# Patient Record
Sex: MALE | Race: WHITE | NOT HISPANIC OR LATINO | URBAN - METROPOLITAN AREA
[De-identification: names, ages, dates, MRNs, and addresses within clinical notes are randomized per-mention and may not be internally consistent; named-entity substitution may affect disease eponyms.]

---

## 2023-10-16 ENCOUNTER — HOSPITAL ENCOUNTER (EMERGENCY)
Facility: HOSPITAL | Age: 33
Discharge: LEFT BEFORE DISCHARGE | End: 2023-10-16
Payer: MEDICAID

## 2023-10-16 PROCEDURE — 99211 OFF/OP EST MAY X REQ PHY/QHP: CPT

## 2023-10-16 NOTE — ED TRIAGE NOTES
Patient reportedly was drinking out of an old coke bottle when he was stung by a bee. Reportedly EMS gave 0.3 epi IM and 50 mg of benadryl through peripheral IV placed today by EMS. Patient is a&o x4 with no c.o shortness of breath per EMS at this time.

## 2023-10-16 NOTE — ED NOTES
Patient left facility with IV access, RN contacted [facility] dispatch office at [phone] who stated they would reach out to patient regarding peripheral IV.

## 2023-12-09 ENCOUNTER — APPOINTMENT (OUTPATIENT)
Dept: GENERAL RADIOLOGY | Facility: HOSPITAL | Age: 33
End: 2023-12-09
Payer: MEDICAID

## 2023-12-09 ENCOUNTER — HOSPITAL ENCOUNTER (INPATIENT)
Facility: HOSPITAL | Age: 33
LOS: 2 days | Discharge: HOME | End: 2023-12-12
Attending: EMERGENCY MEDICINE | Admitting: FAMILY MEDICINE
Payer: MEDICAID

## 2023-12-09 DIAGNOSIS — F15.10 METHAMPHETAMINE ABUSE: ICD-10-CM

## 2023-12-09 DIAGNOSIS — I50.9 ACUTE CONGESTIVE HEART FAILURE, UNSPECIFIED HEART FAILURE TYPE: ICD-10-CM

## 2023-12-09 DIAGNOSIS — J81.0 ACUTE PULMONARY EDEMA: Primary | ICD-10-CM

## 2023-12-09 DIAGNOSIS — F19.90 IV DRUG USER: ICD-10-CM

## 2023-12-09 LAB
ALBUMIN SERPL-MCNC: 3.8 G/DL (ref 3.5–5.2)
ALBUMIN/GLOB SERPL: 1.6 G/DL
ALP SERPL-CCNC: 101 U/L (ref 39–117)
ALT SERPL W P-5'-P-CCNC: 22 U/L (ref 1–41)
ANION GAP SERPL CALCULATED.3IONS-SCNC: 11.2 MMOL/L (ref 5–15)
AST SERPL-CCNC: 23 U/L (ref 1–40)
BASOPHILS # BLD AUTO: 0.05 10*3/MM3 (ref 0–0.2)
BASOPHILS NFR BLD AUTO: 0.7 % (ref 0–1.5)
BILIRUB SERPL-MCNC: 1 MG/DL (ref 0–1.2)
BUN SERPL-MCNC: 19 MG/DL (ref 6–20)
BUN/CREAT SERPL: 14.1 (ref 7–25)
CALCIUM SPEC-SCNC: 9 MG/DL (ref 8.6–10.5)
CHLORIDE SERPL-SCNC: 105 MMOL/L (ref 98–107)
CO2 SERPL-SCNC: 23.8 MMOL/L (ref 22–29)
CREAT SERPL-MCNC: 1.35 MG/DL (ref 0.76–1.27)
DEPRECATED RDW RBC AUTO: 46.3 FL (ref 37–54)
EGFRCR SERPLBLD CKD-EPI 2021: 70.2 ML/MIN/1.73
EOSINOPHIL # BLD AUTO: 0.07 10*3/MM3 (ref 0–0.4)
EOSINOPHIL NFR BLD AUTO: 0.9 % (ref 0.3–6.2)
ERYTHROCYTE [DISTWIDTH] IN BLOOD BY AUTOMATED COUNT: 12.9 % (ref 12.3–15.4)
FLUAV SUBTYP SPEC NAA+PROBE: NOT DETECTED
FLUBV RNA ISLT QL NAA+PROBE: NOT DETECTED
GLOBULIN UR ELPH-MCNC: 2.4 GM/DL
GLUCOSE SERPL-MCNC: 114 MG/DL (ref 65–99)
HCT VFR BLD AUTO: 40.1 % (ref 37.5–51)
HGB BLD-MCNC: 12.7 G/DL (ref 13–17.7)
HOLD SPECIMEN: NORMAL
HOLD SPECIMEN: NORMAL
IMM GRANULOCYTES # BLD AUTO: 0.01 10*3/MM3 (ref 0–0.05)
IMM GRANULOCYTES NFR BLD AUTO: 0.1 % (ref 0–0.5)
LYMPHOCYTES # BLD AUTO: 2.39 10*3/MM3 (ref 0.7–3.1)
LYMPHOCYTES NFR BLD AUTO: 31.5 % (ref 19.6–45.3)
MCH RBC QN AUTO: 30.9 PG (ref 26.6–33)
MCHC RBC AUTO-ENTMCNC: 31.7 G/DL (ref 31.5–35.7)
MCV RBC AUTO: 97.6 FL (ref 79–97)
MONOCYTES # BLD AUTO: 0.43 10*3/MM3 (ref 0.1–0.9)
MONOCYTES NFR BLD AUTO: 5.7 % (ref 5–12)
NEUTROPHILS NFR BLD AUTO: 4.63 10*3/MM3 (ref 1.7–7)
NEUTROPHILS NFR BLD AUTO: 61.1 % (ref 42.7–76)
NRBC BLD AUTO-RTO: 0 /100 WBC (ref 0–0.2)
NT-PROBNP SERPL-MCNC: 1967 PG/ML (ref 0–450)
PLATELET # BLD AUTO: 232 10*3/MM3 (ref 140–450)
PMV BLD AUTO: 9.1 FL (ref 6–12)
POTASSIUM SERPL-SCNC: 4.2 MMOL/L (ref 3.5–5.2)
PROT SERPL-MCNC: 6.2 G/DL (ref 6–8.5)
RBC # BLD AUTO: 4.11 10*6/MM3 (ref 4.14–5.8)
RSV RNA NPH QL NAA+NON-PROBE: NOT DETECTED
SARS-COV-2 RNA RESP QL NAA+PROBE: NOT DETECTED
SODIUM SERPL-SCNC: 140 MMOL/L (ref 136–145)
TROPONIN T SERPL HS-MCNC: 22 NG/L
WBC NRBC COR # BLD AUTO: 7.58 10*3/MM3 (ref 3.4–10.8)
WHOLE BLOOD HOLD COAG: NORMAL
WHOLE BLOOD HOLD SPECIMEN: NORMAL

## 2023-12-09 PROCEDURE — 83735 ASSAY OF MAGNESIUM: CPT | Performed by: EMERGENCY MEDICINE

## 2023-12-09 PROCEDURE — 84439 ASSAY OF FREE THYROXINE: CPT | Performed by: EMERGENCY MEDICINE

## 2023-12-09 PROCEDURE — 83880 ASSAY OF NATRIURETIC PEPTIDE: CPT

## 2023-12-09 PROCEDURE — 99285 EMERGENCY DEPT VISIT HI MDM: CPT

## 2023-12-09 PROCEDURE — 84145 PROCALCITONIN (PCT): CPT | Performed by: EMERGENCY MEDICINE

## 2023-12-09 PROCEDURE — 93005 ELECTROCARDIOGRAM TRACING: CPT

## 2023-12-09 PROCEDURE — 93005 ELECTROCARDIOGRAM TRACING: CPT | Performed by: EMERGENCY MEDICINE

## 2023-12-09 PROCEDURE — 86140 C-REACTIVE PROTEIN: CPT | Performed by: EMERGENCY MEDICINE

## 2023-12-09 PROCEDURE — 71045 X-RAY EXAM CHEST 1 VIEW: CPT

## 2023-12-09 PROCEDURE — 87637 SARSCOV2&INF A&B&RSV AMP PRB: CPT

## 2023-12-09 PROCEDURE — 93010 ELECTROCARDIOGRAM REPORT: CPT | Performed by: INTERNAL MEDICINE

## 2023-12-09 PROCEDURE — 84484 ASSAY OF TROPONIN QUANT: CPT

## 2023-12-09 PROCEDURE — 80050 GENERAL HEALTH PANEL: CPT

## 2023-12-09 PROCEDURE — 36415 COLL VENOUS BLD VENIPUNCTURE: CPT

## 2023-12-09 RX ORDER — SODIUM CHLORIDE 0.9 % (FLUSH) 0.9 %
10 SYRINGE (ML) INJECTION AS NEEDED
Status: DISCONTINUED | OUTPATIENT
Start: 2023-12-09 | End: 2023-12-12 | Stop reason: HOSPADM

## 2023-12-10 ENCOUNTER — APPOINTMENT (OUTPATIENT)
Dept: CARDIOLOGY | Facility: HOSPITAL | Age: 33
End: 2023-12-10
Payer: MEDICAID

## 2023-12-10 PROBLEM — I50.9 CHF EXACERBATION: Status: ACTIVE | Noted: 2023-12-10

## 2023-12-10 PROBLEM — R06.09 DYSPNEA ON EXERTION: Status: ACTIVE | Noted: 2023-12-10

## 2023-12-10 PROBLEM — F19.90 IV DRUG USER: Status: ACTIVE | Noted: 2023-12-10

## 2023-12-10 PROBLEM — J81.0 ACUTE PULMONARY EDEMA: Status: ACTIVE | Noted: 2023-12-10

## 2023-12-10 LAB
AMPHET+METHAMPHET UR QL: POSITIVE
BARBITURATES UR QL SCN: NEGATIVE
BENZODIAZ UR QL SCN: NEGATIVE
BH CV ECHO MEAS - AO MAX PG: 14 MMHG
BH CV ECHO MEAS - AO MEAN PG: 9 MMHG
BH CV ECHO MEAS - AO ROOT DIAM: 3.7 CM
BH CV ECHO MEAS - AO V2 MAX: 186 CM/SEC
BH CV ECHO MEAS - AO V2 VTI: 31 CM
BH CV ECHO MEAS - AVA(I,D): 1.47 CM2
BH CV ECHO MEAS - EDV(CUBED): 232.6 ML
BH CV ECHO MEAS - EDV(MOD-SP2): 122 ML
BH CV ECHO MEAS - EDV(MOD-SP4): 139 ML
BH CV ECHO MEAS - EF(MOD-BP): 19.9 %
BH CV ECHO MEAS - EF(MOD-SP2): 20.2 %
BH CV ECHO MEAS - EF(MOD-SP4): 19.4 %
BH CV ECHO MEAS - ESV(CUBED): 159.2 ML
BH CV ECHO MEAS - ESV(MOD-SP2): 97.4 ML
BH CV ECHO MEAS - ESV(MOD-SP4): 112 ML
BH CV ECHO MEAS - FS: 11.9 %
BH CV ECHO MEAS - IVS/LVPW: 0.94 CM
BH CV ECHO MEAS - IVSD: 1.01 CM
BH CV ECHO MEAS - LA DIMENSION: 4.5 CM
BH CV ECHO MEAS - LAT PEAK E' VEL: 16.6 CM/SEC
BH CV ECHO MEAS - LV DIASTOLIC VOL/BSA (35-75): 67.2 CM2
BH CV ECHO MEAS - LV MASS(C)D: 270.9 GRAMS
BH CV ECHO MEAS - LV MAX PG: 1.51 MMHG
BH CV ECHO MEAS - LV MEAN PG: 1 MMHG
BH CV ECHO MEAS - LV SYSTOLIC VOL/BSA (12-30): 54.1 CM2
BH CV ECHO MEAS - LV V1 MAX: 61.5 CM/SEC
BH CV ECHO MEAS - LV V1 VTI: 10.1 CM
BH CV ECHO MEAS - LVIDD: 6.2 CM
BH CV ECHO MEAS - LVIDS: 5.4 CM
BH CV ECHO MEAS - LVOT AREA: 4.5 CM2
BH CV ECHO MEAS - LVOT DIAM: 2.4 CM
BH CV ECHO MEAS - LVPWD: 1.07 CM
BH CV ECHO MEAS - MED PEAK E' VEL: 6.4 CM/SEC
BH CV ECHO MEAS - MV A MAX VEL: 42.2 CM/SEC
BH CV ECHO MEAS - MV DEC SLOPE: 649 CM/SEC2
BH CV ECHO MEAS - MV DEC TIME: 0.16 SEC
BH CV ECHO MEAS - MV E MAX VEL: 103 CM/SEC
BH CV ECHO MEAS - MV E/A: 2.44
BH CV ECHO MEAS - RAP SYSTOLE: 3 MMHG
BH CV ECHO MEAS - RVDD: 3.2 CM
BH CV ECHO MEAS - RVSP: 49 MMHG
BH CV ECHO MEAS - SI(MOD-SP2): 11.9 ML/M2
BH CV ECHO MEAS - SI(MOD-SP4): 13 ML/M2
BH CV ECHO MEAS - SV(LVOT): 45.7 ML
BH CV ECHO MEAS - SV(MOD-SP2): 24.6 ML
BH CV ECHO MEAS - SV(MOD-SP4): 27 ML
BH CV ECHO MEAS - TAPSE (>1.6): 2.03 CM
BH CV ECHO MEAS - TR MAX PG: 46 MMHG
BH CV ECHO MEAS - TR MAX VEL: 339 CM/SEC
BH CV ECHO MEASUREMENTS AVERAGE E/E' RATIO: 8.96
CANNABINOIDS SERPL QL: POSITIVE
COCAINE UR QL: NEGATIVE
CRP SERPL-MCNC: 0.64 MG/DL (ref 0–0.5)
D-LACTATE SERPL-SCNC: 1.1 MMOL/L (ref 0.5–2)
FENTANYL UR-MCNC: NEGATIVE NG/ML
IVRT: 82 MS
MAGNESIUM SERPL-MCNC: 2.1 MG/DL (ref 1.6–2.6)
METHADONE UR QL SCN: NEGATIVE
OPIATES UR QL: NEGATIVE
OXYCODONE UR QL SCN: NEGATIVE
PROCALCITONIN SERPL-MCNC: 0.06 NG/ML (ref 0–0.25)
QT INTERVAL: 317 MS
QTC INTERVAL: 445 MS
T4 FREE SERPL-MCNC: 1.42 NG/DL (ref 0.93–1.7)
TSH SERPL DL<=0.05 MIU/L-ACNC: 1.18 UIU/ML (ref 0.27–4.2)

## 2023-12-10 PROCEDURE — 83605 ASSAY OF LACTIC ACID: CPT | Performed by: EMERGENCY MEDICINE

## 2023-12-10 PROCEDURE — 25010000002 FUROSEMIDE PER 20 MG: Performed by: EMERGENCY MEDICINE

## 2023-12-10 PROCEDURE — 80307 DRUG TEST PRSMV CHEM ANLYZR: CPT | Performed by: EMERGENCY MEDICINE

## 2023-12-10 PROCEDURE — 99223 1ST HOSP IP/OBS HIGH 75: CPT | Performed by: FAMILY MEDICINE

## 2023-12-10 PROCEDURE — 25010000002 ENOXAPARIN PER 10 MG: Performed by: FAMILY MEDICINE

## 2023-12-10 PROCEDURE — 25010000002 FUROSEMIDE PER 20 MG: Performed by: FAMILY MEDICINE

## 2023-12-10 PROCEDURE — 87040 BLOOD CULTURE FOR BACTERIA: CPT | Performed by: EMERGENCY MEDICINE

## 2023-12-10 PROCEDURE — 93306 TTE W/DOPPLER COMPLETE: CPT

## 2023-12-10 PROCEDURE — 93306 TTE W/DOPPLER COMPLETE: CPT | Performed by: INTERNAL MEDICINE

## 2023-12-10 RX ORDER — NICOTINE 21 MG/24HR
1 PATCH, TRANSDERMAL 24 HOURS TRANSDERMAL
Status: DISCONTINUED | OUTPATIENT
Start: 2023-12-10 | End: 2023-12-12 | Stop reason: HOSPADM

## 2023-12-10 RX ORDER — BISACODYL 5 MG/1
5 TABLET, DELAYED RELEASE ORAL DAILY PRN
Status: DISCONTINUED | OUTPATIENT
Start: 2023-12-10 | End: 2023-12-12 | Stop reason: HOSPADM

## 2023-12-10 RX ORDER — BISACODYL 10 MG
10 SUPPOSITORY, RECTAL RECTAL DAILY PRN
Status: DISCONTINUED | OUTPATIENT
Start: 2023-12-10 | End: 2023-12-12 | Stop reason: HOSPADM

## 2023-12-10 RX ORDER — SODIUM CHLORIDE 0.9 % (FLUSH) 0.9 %
10 SYRINGE (ML) INJECTION AS NEEDED
Status: DISCONTINUED | OUTPATIENT
Start: 2023-12-10 | End: 2023-12-12 | Stop reason: HOSPADM

## 2023-12-10 RX ORDER — FUROSEMIDE 10 MG/ML
80 INJECTION INTRAMUSCULAR; INTRAVENOUS ONCE
Status: COMPLETED | OUTPATIENT
Start: 2023-12-10 | End: 2023-12-10

## 2023-12-10 RX ORDER — FUROSEMIDE 10 MG/ML
40 INJECTION INTRAMUSCULAR; INTRAVENOUS EVERY 12 HOURS
Status: DISCONTINUED | OUTPATIENT
Start: 2023-12-10 | End: 2023-12-12

## 2023-12-10 RX ORDER — NITROGLYCERIN 0.4 MG/1
0.4 TABLET SUBLINGUAL
Status: DISCONTINUED | OUTPATIENT
Start: 2023-12-10 | End: 2023-12-12 | Stop reason: HOSPADM

## 2023-12-10 RX ORDER — ENOXAPARIN SODIUM 100 MG/ML
40 INJECTION SUBCUTANEOUS DAILY
Status: DISCONTINUED | OUTPATIENT
Start: 2023-12-10 | End: 2023-12-12 | Stop reason: HOSPADM

## 2023-12-10 RX ORDER — AMOXICILLIN 250 MG
2 CAPSULE ORAL 2 TIMES DAILY
Status: DISCONTINUED | OUTPATIENT
Start: 2023-12-10 | End: 2023-12-12 | Stop reason: HOSPADM

## 2023-12-10 RX ORDER — SODIUM CHLORIDE 9 MG/ML
40 INJECTION, SOLUTION INTRAVENOUS AS NEEDED
Status: DISCONTINUED | OUTPATIENT
Start: 2023-12-10 | End: 2023-12-12 | Stop reason: HOSPADM

## 2023-12-10 RX ORDER — SODIUM CHLORIDE 0.9 % (FLUSH) 0.9 %
10 SYRINGE (ML) INJECTION EVERY 12 HOURS SCHEDULED
Status: DISCONTINUED | OUTPATIENT
Start: 2023-12-10 | End: 2023-12-12 | Stop reason: HOSPADM

## 2023-12-10 RX ORDER — METOPROLOL SUCCINATE 25 MG/1
25 TABLET, EXTENDED RELEASE ORAL
Status: DISCONTINUED | OUTPATIENT
Start: 2023-12-10 | End: 2023-12-11

## 2023-12-10 RX ORDER — POLYETHYLENE GLYCOL 3350 17 G/17G
17 POWDER, FOR SOLUTION ORAL DAILY PRN
Status: DISCONTINUED | OUTPATIENT
Start: 2023-12-10 | End: 2023-12-12 | Stop reason: HOSPADM

## 2023-12-10 RX ADMIN — METOPROLOL SUCCINATE 25 MG: 25 TABLET, EXTENDED RELEASE ORAL at 18:21

## 2023-12-10 RX ADMIN — NICOTINE 1 PATCH: 21 PATCH, EXTENDED RELEASE TRANSDERMAL at 14:02

## 2023-12-10 RX ADMIN — FUROSEMIDE 80 MG: 10 INJECTION, SOLUTION INTRAMUSCULAR; INTRAVENOUS at 00:33

## 2023-12-10 RX ADMIN — FUROSEMIDE 40 MG: 10 INJECTION, SOLUTION INTRAMUSCULAR; INTRAVENOUS at 06:02

## 2023-12-10 RX ADMIN — FUROSEMIDE 40 MG: 10 INJECTION, SOLUTION INTRAMUSCULAR; INTRAVENOUS at 18:21

## 2023-12-10 RX ADMIN — Medication 10 ML: at 20:24

## 2023-12-10 RX ADMIN — Medication 10 ML: at 09:21

## 2023-12-10 RX ADMIN — ENOXAPARIN SODIUM 40 MG: 100 INJECTION SUBCUTANEOUS at 03:27

## 2023-12-10 RX ADMIN — SACUBITRIL AND VALSARTAN 1 TABLET: 24; 26 TABLET, FILM COATED ORAL at 20:25

## 2023-12-10 NOTE — H&P
[facility]   HISTORY AND PHYSICAL    Patient Name: Osmel Powell  : 1990  MRN: [id number]  Primary Care Physician:  Provider, No Known  Date of admission: 2023    Subjective   Subjective     Chief Complaint: Dyspnea on exertion    HPI:    Osmel Powell is a 33 y.o. male with past medical history of IV drug use, and anxiety depression presents to the ED with complaints of worsening dyspnea on exertion and lower extremity edema.  Patient states that over the last few weeks she has been having worsening dyspnea on exertion to where he is symptomatic after just walking across the room along with lower extremity edema and orthopnea.  Patient denied any chest pain, fevers or chills.  Due to his worsening symptoms patient came to the ED for further evaluation.  Patient did admit to recent IV drug use 1 week prior with methamphetamines.  In the ED patient was tachycardic on arrival with remaining vitals being within normal limits.  Drug screen was positive for THC of methamphetamines and labs showed he did have an elevated proBNP with mildly elevated troponin and CRP level with remaining labs being relatively unremarkable including a negative procalcitonin and lactic acid level.  Chest x-ray showed findings suggestive of pulmonary edema.  When asked she denied any recent fevers, chills, headaches, focal weakness, chest pain,  abdominal pain, nausea, vomiting, diarrhea, constipation, dysuria, hematuria, hematochezia, melena, or anxiety.  Patient admitted for further evaluation and treatment.    Review of Systems   All systems were reviewed and negative except for: As per HPI    Personal History     History reviewed. No pertinent past medical history.    Past Surgical History:   Procedure Laterality Date    ADENOIDECTOMY      TONSILLECTOMY         Family History: family history is not on file. Otherwise pertinent FHx was reviewed and not pertinent to current issue.    Social History:  reports that he has  been smoking cigarettes. He has a 7.50 pack-year smoking history. He has never used smokeless tobacco. He reports current drug use. Drug: Marijuana. He reports that he does not drink alcohol.    Home Medications:         Allergies:  No Known Allergies    Objective   Objective     Vitals:   Temp:  [98.2 °F (36.8 °C)-98.3 °F (36.8 °C)] 98.2 °F (36.8 °C)  Heart Rate:  [106-125] 106  Resp:  [18-20] 18  BP: (123-134)/(81-99) 128/81  Physical Exam    Constitutional: Awake, alert   Eyes: PERRLA, sclerae anicteric, no conjunctival injection   HENT: NCAT, mucous membranes moist   Neck: Supple, no thyromegaly, no lymphadenopathy, trachea midline   Respiratory: Clear to auscultation bilaterally, nonlabored respirations    Cardiovascular: Tachycardia, systolic murmur, no rubs, or gallops, palpable pedal pulses bilaterally   Gastrointestinal: Positive bowel sounds, soft, nontender, nondistended   Musculoskeletal: Bilateral lower extremity edema, no clubbing or cyanosis to extremities   Psychiatric: Appropriate affect, cooperative   Neurologic: Oriented x 3, strength symmetric in all extremities, Cranial Nerves grossly intact to confrontation, speech clear   Skin: No rashes     Result Review    Result Review:  I have personally reviewed the results from the time of this admission to 12/10/2023 04:37 EST and agree with these findings:  [x]  Laboratory list / accordion  []  Microbiology  [x]  Radiology  [x]  EKG/Telemetry   []  Cardiology/Vascular   []  Pathology  []  Old records  []  Other:  Most notable findings include: Elevated proBNP, x-ray with pulmonary edema, subsequent positive for methamphetamine and THC, lactic acid and procalcitonin normal      Assessment & Plan   Assessment / Plan     Brief Patient Summary:  Osmel Powell is a 33 y.o. male with past medical history of IV drug use, and anxiety depression presents to the ED with complaints of worsening dyspnea on exertion and lower extremity edema.    Active Hospital  Problems:  Active Hospital Problems    Diagnosis     **Dyspnea on exertion     IV drug user     Acute pulmonary edema      Plan:     Dyspnea on exertion  -Admit to telemetry  -Patient known IV drug user  -Likely CHF/endocarditis  -Chest x-ray reviewed  -BNP elevated  -We will diurese with IV Lasix 40 mg twice daily.  Titrate as needed  -Strict input output  -1.5 L fluid restriction  -Daily weights  -Supplemental oxygen as needed  -Echocardiogram  -Blood cultures  -Will hold off on antibiotics for now  -We will consult cardiology if warranted  -Supportive care    History of IV drug use  History of anxiety depression    GI ppx  DVT ppx        DVT prophylaxis:  Medical DVT prophylaxis orders are present.    CODE STATUS:    Level Of Support Discussed With: Patient  Code Status (Patient has no pulse and is not breathing): CPR (Attempt to Resuscitate)  Medical Interventions (Patient has pulse or is breathing): Full Support    Admission Status:  I believe this patient meets inpatient status.      Electronically signed by Gerry Moran MD, 12/10/23, 4:37 AM EST.

## 2023-12-10 NOTE — PLAN OF CARE
Goal Outcome Evaluation:   Pt alert and oriented, is able to make needs known to staff, up @ [facility], family at bedside. O2@ 97% on RA. No c/o pain nor discomfort this shift, call light in reach, will cont plan of care.

## 2023-12-10 NOTE — ED TRIAGE NOTES
Pt comes to the ER tonight for shortness of breath and swelling of the feet. Pt states he has had a head cold for about a week but the shortness of breath and swelling started about about 3 days ago.

## 2023-12-10 NOTE — PROGRESS NOTES
[facility]   Hospitalist Progress Note  Date: 12/10/2023  Patient Name: Osmel Powell  : 1990  MRN: [id number]  Date of admission: 2023  Room/Bed: 402/1      Subjective   Subjective     Chief Complaint: Worsening shortness of breath and lower extremity edema    Summary:Osmel Powell is a 33 y.o. male with past medical history of anxiety, depression and IV drug use who presented with complaints of worsening shortness of breath and lower extremity edema.  Patient has been having shortness of breath for past few weeks which has been worsening and present even while walking across the room.  It was associated with lower extremity edema and orthopnea.  Patient got concerned and presented to ED for further evaluation.  Patient stated he had used IV drug methamphetamine about 2 days back.  Also smokes half a pack a day.  Denied alcohol consumption.  On presentation, patient was tachycardic.  Drug screen was positive for THC and methamphetamine.  proBNP was elevated with a value of 1967.  Troponin of 22.  Chest x-ray suggestive of pulmonary edema.  Patient was started on IV Lasix and was admitted for further evaluation and management.    Interval Followup: No acute events overnight.  Urinary output of 2700 cc since admission.  Stated his shortness of breath is improving since admission.  Denied any fever, chills, rigors, chest pain, abdominal pain, lightheadedness, nausea or vomiting.    Review of Systems    All systems reviewed and negative except for what is outlined above.      Objective   Objective     Vitals:   Temp:  [98.2 °F (36.8 °C)-98.3 °F (36.8 °C)] 98.2 °F (36.8 °C)  Heart Rate:  [106-125] 111  Resp:  [18-20] 18  BP: (123-134)/(74-99) 129/74    Physical Exam   General: Awake, alert, NAD  Cardiovascular: RRR, no murmurs; 1+ lower extremity pitting edema bilaterally.  Pulmonary: CTA bilaterally; no wheezes; no conversational dyspnea  Gastrointestinal: S/ND/NT, +BS  Neuro: Alert, awake, oriented  x 3 speech clear; no tremor  : No Sanchez catheter; no suprapubic tenderness    Result Review    Result Review:  I have personally reviewed these results:  [x]  Laboratory      Lab 12/10/23  0027 12/09/23 2116   WBC  --  7.58   HEMOGLOBIN  --  12.7*   HEMATOCRIT  --  40.1   PLATELETS  --  232   NEUTROS ABS  --  4.63   IMMATURE GRANS (ABS)  --  0.01   LYMPHS ABS  --  2.39   MONOS ABS  --  0.43   EOS ABS  --  0.07   MCV  --  97.6*   CRP  --  0.64*   PROCALCITONIN  --  0.06   LACTATE 1.1  --          Lab 12/09/23 2116   SODIUM 140   POTASSIUM 4.2   CHLORIDE 105   CO2 23.8   ANION GAP 11.2   BUN 19   CREATININE 1.35*   EGFR 70.2   GLUCOSE 114*   CALCIUM 9.0   MAGNESIUM 2.1   TSH 1.180         Lab 12/09/23 2116   TOTAL PROTEIN 6.2   ALBUMIN 3.8   GLOBULIN 2.4   ALT (SGPT) 22   AST (SGOT) 23   BILIRUBIN 1.0   ALK PHOS 101         Lab 12/09/23 2116   PROBNP 1,967.0*   HSTROP T 22*                 Brief Urine Lab Results       None          [x]  Microbiology   Microbiology Results (last 10 days)       Procedure Component Value - Date/Time    COVID-19, FLU A/B, RSV PCR 1 HR TAT - Swab, Nasopharynx [[id number]]  (Normal) Collected: 12/09/23 2050    Lab Status: Final result Specimen: Swab from Nasopharynx Updated: 12/09/23 2200     COVID19 Not Detected     Influenza A PCR Not Detected     Influenza B PCR Not Detected     RSV, PCR Not Detected    Narrative:      Fact sheet for providers: https://www.fda.gov/media/952175/download    Fact sheet for patients: https://www.fda.gov/media/593081/download    Test performed by PCR.          [x]  Radiology  XR Chest 1 View    Result Date: 12/9/2023   Bilateral infiltrates are seen.  The findings may represent pulmonary edema with vascular congestion.  Infectious multifocal pneumonia is thought to be less likely.       Please note that portions of this note were completed with a voice recognition program.  JONATHAN FABIAN JR, MD       Electronically Signed and Approved By: JONATHAN BERNARDO  CAREN BRAND MD on 12/09/2023 at 23:11             []  EKG/Telemetry   []  Cardiology/Vascular   []  Pathology  []  Old records  []  Other:    Assessment & Plan   Assessment / Plan     Assessment:  Shortness of breath on exertion  Acute pulmonary edema  Lower extremity edema  IV drug user  Active tobacco user  Depression/anxiety    Plan:  Patient is currently being managed in hospitalist service.  S/p 1 dose of IV Lasix 80 mg in the ED.  Currently on 40 mg IV Lasix every 12 hours.  Urinary output of 2700 cc since admission.  BNP elevated on presentation.  Transthoracic echo ordered to rule out cardiomyopathy, follow-up result.  On nicotine patch for active tobacco user, smokes about half a pack a day.  Currently saturating well on room air.  On cardiac telemetry.  Continue daily weights.  Continue to monitor input and output strictly.  Will consult cardiology depending upon echocardiogram result.  Continue rest of the current management.      DVT prophylaxis:  Medical DVT prophylaxis orders are present.    CODE STATUS:   Level Of Support Discussed With: Patient  Code Status (Patient has no pulse and is not breathing): CPR (Attempt to Resuscitate)  Medical Interventions (Patient has pulse or is breathing): Full Support      Electronically signed by Elaina Tavares MD, 12/10/23, 8:14 AM EST.

## 2023-12-10 NOTE — ED PROVIDER NOTES
Time: 8:54 PM EST  Date of encounter:  12/9/2023  Independent Historian/Clinical History and Information was obtained by:   Patient    History is limited by: N/A    Chief Complaint   Patient presents with    Shortness of Breath    Edema         History of Present Illness:  Patient is a 33 y.o. year old male who presents to the emergency department for evaluation of shortness of breath. States he had a head cold 1 week ago.  The patient notes that the cough is mild.  The patient notes that he has had progressive dyspnea however with exertion.  At first it was of moderate exertion and outs with minimal exertion.  The patient also notes orthopnea.  The patient also notes PND.  The patient also notes increasing edema in his legs over the last 3 days.  The patient denies any fever, rigors or myalgias.  Patient denies any chest pain or chest pressure.  The patient denies any abdominal pain.  The patient denies any rash.  The patient does admit to IV drug use.  He states his last IV drug use was 7 days ago.  He does inject IV methamphetamine.  Patient also admits to marijuana.  States that he has no medical problems and takes no medicines  Patient Care Team  Primary Care Provider: Provider, No Known    Past Medical History:     No Known Allergies  History reviewed. No pertinent past medical history.  Past Surgical History:   Procedure Laterality Date    ADENOIDECTOMY      TONSILLECTOMY       History reviewed. No pertinent family history.    Home Medications:  Prior to Admission medications    Not on File        Social History:   Social History     Tobacco Use    Smoking status: Every Day     Packs/day: 0.50     Years: 15.00     Additional pack years: 0.00     Total pack years: 7.50     Types: Cigarettes    Smokeless tobacco: Never   Vaping Use    Vaping Use: Never used   Substance Use Topics    Alcohol use: Never    Drug use: Yes     Types: Marijuana     Comment: hx IV meth         Review of Systems:  Review of Systems  "  Constitutional:  Negative for chills, diaphoresis and fever.   HENT:  Negative for congestion, postnasal drip, rhinorrhea and sore throat.    Eyes:  Negative for photophobia.   Respiratory:  Positive for cough and shortness of breath. Negative for chest tightness.    Cardiovascular:  Positive for leg swelling. Negative for chest pain and palpitations.   Gastrointestinal:  Negative for abdominal pain, diarrhea, nausea and vomiting.   Genitourinary:  Negative for difficulty urinating, dysuria, flank pain, frequency, hematuria and urgency.   Musculoskeletal:  Negative for neck pain and neck stiffness.   Skin:  Negative for pallor and rash.   Neurological:  Negative for dizziness, syncope, weakness, numbness and headaches.   Hematological:  Negative for adenopathy. Does not bruise/bleed easily.   Psychiatric/Behavioral: Negative.          Physical Exam:  /75 (BP Location: Right arm, Patient Position: Lying)   Pulse 100   Temp 97.3 °F (36.3 °C)   Resp 18   Ht 177.8 cm (70\")   Wt 88.6 kg (195 lb 5.2 oz)   SpO2 95%   BMI 28.03 kg/m²         Physical Exam  Vitals and nursing note reviewed.   Constitutional:       General: He is not in acute distress.     Appearance: Normal appearance. He is not ill-appearing, toxic-appearing or diaphoretic.   HENT:      Head: Normocephalic and atraumatic.      Mouth/Throat:      Mouth: Mucous membranes are moist.   Eyes:      Extraocular Movements: Extraocular movements intact.      Conjunctiva/sclera: Conjunctivae normal.      Pupils: Pupils are equal, round, and reactive to light.   Cardiovascular:      Rate and Rhythm: Regular rhythm. Tachycardia present.      Pulses: Normal pulses.           Carotid pulses are 2+ on the right side and 2+ on the left side.       Radial pulses are 2+ on the right side and 2+ on the left side.        Femoral pulses are 2+ on the right side and 2+ on the left side.       Popliteal pulses are 2+ on the right side and 2+ on the left side.       "  Dorsalis pedis pulses are 2+ on the right side and 2+ on the left side.        Posterior tibial pulses are 2+ on the right side and 2+ on the left side.      Heart sounds: Normal heart sounds. No murmur heard.     Comments: The patient appears to have a systolic click  Pulmonary:      Effort: Pulmonary effort is normal. No accessory muscle usage, respiratory distress or retractions.      Breath sounds: Examination of the right-upper field reveals decreased breath sounds. Examination of the left-upper field reveals decreased breath sounds. Examination of the right-middle field reveals decreased breath sounds. Examination of the left-middle field reveals decreased breath sounds. Examination of the right-lower field reveals decreased breath sounds and rales. Examination of the left-lower field reveals decreased breath sounds and rales. Decreased breath sounds present. No wheezing, rhonchi or rales.   Chest:      Chest wall: No mass or tenderness.   Abdominal:      General: Abdomen is flat. There is no distension.      Palpations: Abdomen is soft. There is no mass or pulsatile mass.      Tenderness: There is no abdominal tenderness. There is no right CVA tenderness, left CVA tenderness, guarding or rebound.      Comments: No rigidity   Musculoskeletal:         General: No swelling, tenderness or deformity.      Cervical back: Neck supple. No tenderness.      Right lower leg: No tenderness. Edema present.      Left lower leg: No tenderness. Edema present.   Skin:     General: Skin is warm and dry.      Capillary Refill: Capillary refill takes less than 2 seconds.      Coloration: Skin is not cyanotic, jaundiced or pale.      Findings: No erythema.   Neurological:      General: No focal deficit present.      Mental Status: He is alert and oriented to person, place, and time. Mental status is at baseline.      Cranial Nerves: Cranial nerves 2-12 are intact. No cranial nerve deficit.      Sensory: Sensation is intact. No  sensory deficit.      Motor: Motor function is intact. No weakness or pronator drift.      Coordination: Coordination is intact. Coordination normal.   Psychiatric:         Attention and Perception: Attention and perception normal.         Mood and Affect: Mood normal.         Behavior: Behavior normal.                Procedures:  Procedures      Medical Decision Making:      Comorbidities that affect care:    IV drug use, current smoker    External Notes reviewed:    None      The following orders were placed and all results were independently analyzed by me:  Orders Placed This Encounter   Procedures    COVID-19, FLU A/B, RSV PCR 1 HR TAT - Swab, Nasopharynx    Blood Culture - Blood,    Blood Culture - Blood,    XR Chest 1 View    [place] Draw    Comprehensive Metabolic Panel    BNP    Single High Sensitivity Troponin T    CBC Auto Differential    Lactic Acid, Plasma    Procalcitonin    C-reactive Protein    Magnesium    T4, Free    TSH    Urine Drug Screen - Urine, Clean Catch    High Sensitivity Troponin T    Phosphorus    Magnesium    CBC Auto Differential    Magnesium    Phosphorus    CBC Auto Differential    Ambulatory Referral to Heart Failure Clinic    Undress & Gown    Continuous Pulse Oximetry    Vital Signs    Discharge Follow-up with PCP    Discharge Follow-up with Specified Provider: cardiology; 2 Weeks    Diet: Cardiac Diets; Healthy Heart (2-3 Na+); Thin (IDDSI 0)    Stress Test With Myocardial Perfusion One Day    ECG 12 Lead ED Triage Standing Order; SOA    Adult Transthoracic Echo Complete w/ Color, Spectral and Contrast if necessary per protocol    Inpatient Admission    Inpatient Admission    Discharge patient    CBC & Differential    Green Top (Gel)    Lavender Top    Gold Top - SST    Light Blue Top    CBC & Differential    CBC & Differential       Medications Given in the Emergency Department:  Medications   furosemide (LASIX) injection 80 mg (80 mg Intravenous Given 12/10/23 0033)    technetium tetrofosmin (Tc-MYOVIEW) injection 1 dose (1 dose Intravenous Given 12/11/23 1039)   technetium tetrofosmin (Tc-MYOVIEW) injection 1 dose (1 dose Intravenous Given 12/11/23 1152)   regadenoson (LEXISCAN) injection 0.4 mg (0.4 mg Intravenous Given 12/11/23 1152)        ED Course:    The patient was initially evaluated in the triage area where orders were placed. The patient was later dispositioned by Maximino Bean DO.      The patient was advised to stay for completion of workup which includes but is not limited to communication of labs and radiological results, reassessment and plan. The patient was advised that leaving prior to disposition by a provider could result in critical findings that are not communicated to the patient.     ED Course as of 12/14/23 0319   Sat Dec 09, 2023   2053 EKG:    Rhythm: Sinus tachycardia  Rate: 118  Intervals: Normal NH and QT interval  Probable left atrial enlargement  T-wave: Nonspecific T wave flattening  ST Segment: No pathological ST elevation and reciprocal ST depression to suggest STEMI    EKG Comparison: No EKG available for comparison    Interpreted by me   [SD]      ED Course User Index  [SD] Maximino Bean DO       Labs:    Lab Results (last 24 hours)       ** No results found for the last 24 hours. **             Imaging:    No Radiology Exams Resulted Within Past 24 Hours      Differential Diagnosis and Discussion:      Dyspnea: Differential diagnosis includes but is not limited to metabolic acidosis, neurological disorders, psychogenic, asthma, pneumothorax, upper airway obstruction, COPD, pneumonia, noncardiogenic pulmonary edema, interstitial lung disease, anemia, congestive heart failure, and pulmonary embolism    All labs were reviewed and interpreted by me.  All X-rays impressions were independently interpreted by me.  EKG was interpreted by me.    MDM  Number of Diagnoses or Management Options  Acute congestive heart failure, unspecified heart  failure type  Acute pulmonary edema  IV drug user  Methamphetamine abuse  Diagnosis management comments: The patient's urine toxicology was positive for methamphetamine and marijuana.    Blood cultures were ordered and pending at the time of admission    The patient's TSH and free T4 were normal.  I do not feel that the patient had thyrotoxicosis or thyroid storm and thus not the cause of the patient's symptoms    Patient's procalcitonin was normal    Patient had a mildly elevated C-reactive protein at 0.64 which is nonspecific    The patient's CMP was reviewed and shows no abnormalities of critical concern.  Of note, the patient's sodium and potassium are acceptable.  The patient's liver enzymes are unremarkable.  The patient's renal function including creatinine is preserved.  The patient has a normal anion gap.    The patient's CBC was reviewed and shows no abnormalities of critical concern.  Of note, there is no anemia requiring a blood transfusion and the platelet count is acceptable    The patient's Covid swab was negative   The patient's Influenza swab was negative     X-ray demonstrates bilateral infiltrates.  The patient's elevated BNP this is thought to be more related to pulmonary edema than multifocal pneumonia    Patient's proBNP was elevated at 1969 which is significant elevated for the patient's age.    The patient was given IV Lasix in the emergency room.    The patient will be admitted for further evaluation of the acute pulmonary edema in light of the patient's age, chest x-ray and elevated BNP    Endocarditis will be ruled out upon admission.  Echocardiogram will be performed in the morning.  Antibiotics will be based upon blood cultures, echocardiogram and clinical outcome       Amount and/or Complexity of Data Reviewed  Clinical lab tests: reviewed  Tests in the radiology section of CPT®: reviewed  Tests in the medicine section of CPT®: reviewed  Discuss the patient with other providers: yes  (01:06 EST  I discussed the case with the hospitalist.  We have discussed the patient's presenting symptoms, laboratory values, imaging and condition at the time of admission.  They will evaluate the patient in the emergency room and admit the patient to the hospital)         Social Determinants of Health:    Patient is independent, reliable, and has access to care.       Disposition and Care Coordination:    Admit:   Through independent evaluation of the patient's history, physical, and imperical data, the patient meets criteria for observation/admission to the hospital.        Final diagnoses:   Acute pulmonary edema   Acute congestive heart failure, unspecified heart failure type   IV drug user   Methamphetamine abuse        ED Disposition       ED Disposition   Decision to Admit    Condition   --    Comment   Level of Care: Telemetry [5]   Diagnosis: CHF exacerbation [735887]   Admitting Physician: ARABELLA OLEARY [[id number]]   Certification: I Certify That Inpatient Hospital Services Are Medically Necessary For Greater Than 2 Midnights                 This medical record created using voice recognition software.             Maximino Bean,   12/14/23 0319

## 2023-12-10 NOTE — PLAN OF CARE
12/26: 3.9 (nicole) No acute changes throughout shift today, echo performed, MD made aware of results, Cardio consulted, vss, no complaints or needs voiced at this time.   Problem: Adjustment to Illness (Heart Failure)  Goal: Optimal Coping  Outcome: Ongoing, Progressing  Intervention: Support Psychosocial Response  Recent Flowsheet Documentation  Taken 12/10/2023 0900 by Nelly Mcgrath RN  Supportive Measures: self-care encouraged  Family/Support System Care: self-care encouraged     Problem: Cardiac Output Decreased (Heart Failure)  Goal: Optimal Cardiac Output  Outcome: Ongoing, Progressing     Problem: Dysrhythmia (Heart Failure)  Goal: Stable Heart Rate and Rhythm  Outcome: Ongoing, Progressing     Problem: Fluid Imbalance (Heart Failure)  Goal: Fluid Balance  Outcome: Ongoing, Progressing     Problem: Functional Ability Impaired (Heart Failure)  Goal: Optimal Functional Ability  Outcome: Ongoing, Progressing     Problem: Oral Intake Inadequate (Heart Failure)  Goal: Optimal Nutrition Intake  Outcome: Ongoing, Progressing     Problem: Respiratory Compromise (Heart Failure)  Goal: Effective Oxygenation and Ventilation  Outcome: Ongoing, Progressing  Intervention: Promote Airway Secretion Clearance  Recent Flowsheet Documentation  Taken 12/10/2023 0900 by Nelly Mcgrath RN  Cough And Deep Breathing: done independently per patient     Problem: Sleep Disordered Breathing (Heart Failure)  Goal: Effective Breathing Pattern During Sleep  Outcome: Ongoing, Progressing   Goal Outcome Evaluation:

## 2023-12-11 ENCOUNTER — APPOINTMENT (OUTPATIENT)
Dept: NUCLEAR MEDICINE | Facility: HOSPITAL | Age: 33
End: 2023-12-11
Payer: MEDICAID

## 2023-12-11 LAB
ANION GAP SERPL CALCULATED.3IONS-SCNC: 11.4 MMOL/L (ref 5–15)
BASOPHILS # BLD AUTO: 0.08 10*3/MM3 (ref 0–0.2)
BASOPHILS NFR BLD AUTO: 1 % (ref 0–1.5)
BH CV IMMEDIATE POST TECH DATA BLOOD PRESSURE: NORMAL MMHG
BH CV IMMEDIATE POST TECH DATA HEART RATE: 106 BPM
BH CV IMMEDIATE POST TECH DATA OXYGEN SATS: 95 %
BH CV REST NUCLEAR ISOTOPE DOSE: 9 MCI
BH CV SIX MINUTE RECOVERY TECH DATA BLOOD PRESSURE: NORMAL
BH CV SIX MINUTE RECOVERY TECH DATA HEART RATE: 95 BPM
BH CV SIX MINUTE RECOVERY TECH DATA OXYGEN SATURATION: 93 %
BH CV STRESS BP STAGE 1: NORMAL
BH CV STRESS COMMENTS STAGE 1: NORMAL
BH CV STRESS DOSE REGADENOSON STAGE 1: 0.4
BH CV STRESS DURATION MIN STAGE 1: 0
BH CV STRESS DURATION SEC STAGE 1: 10
BH CV STRESS HR STAGE 1: 94
BH CV STRESS NUCLEAR ISOTOPE DOSE: 30 MCI
BH CV STRESS O2 STAGE 1: 95
BH CV STRESS PROTOCOL 1: NORMAL
BH CV STRESS RECOVERY BP: NORMAL MMHG
BH CV STRESS RECOVERY HR: 95 BPM
BH CV STRESS RECOVERY O2: 93 %
BH CV STRESS STAGE 1: 1
BH CV THREE MINUTE POST TECH DATA BLOOD PRESSURE: NORMAL MMHG
BH CV THREE MINUTE POST TECH DATA HEART RATE: 102 BPM
BH CV THREE MINUTE POST TECH DATA OXYGEN SATURATION: 95 %
BUN SERPL-MCNC: 16 MG/DL (ref 6–20)
BUN/CREAT SERPL: 13.7 (ref 7–25)
CALCIUM SPEC-SCNC: 9.1 MG/DL (ref 8.6–10.5)
CHLORIDE SERPL-SCNC: 101 MMOL/L (ref 98–107)
CO2 SERPL-SCNC: 26.6 MMOL/L (ref 22–29)
CREAT SERPL-MCNC: 1.17 MG/DL (ref 0.76–1.27)
DEPRECATED RDW RBC AUTO: 44.1 FL (ref 37–54)
EGFRCR SERPLBLD CKD-EPI 2021: 83.4 ML/MIN/1.73
EOSINOPHIL # BLD AUTO: 0.16 10*3/MM3 (ref 0–0.4)
EOSINOPHIL NFR BLD AUTO: 2 % (ref 0.3–6.2)
ERYTHROCYTE [DISTWIDTH] IN BLOOD BY AUTOMATED COUNT: 12.8 % (ref 12.3–15.4)
GLUCOSE SERPL-MCNC: 86 MG/DL (ref 65–99)
HCT VFR BLD AUTO: 43.9 % (ref 37.5–51)
HGB BLD-MCNC: 14.6 G/DL (ref 13–17.7)
IMM GRANULOCYTES # BLD AUTO: 0.01 10*3/MM3 (ref 0–0.05)
IMM GRANULOCYTES NFR BLD AUTO: 0.1 % (ref 0–0.5)
LV EF NUC BP: 9 %
LYMPHOCYTES # BLD AUTO: 3.27 10*3/MM3 (ref 0.7–3.1)
LYMPHOCYTES NFR BLD AUTO: 40 % (ref 19.6–45.3)
MAGNESIUM SERPL-MCNC: 2.2 MG/DL (ref 1.6–2.6)
MAXIMAL PREDICTED HEART RATE: 185 BPM
MCH RBC QN AUTO: 31.5 PG (ref 26.6–33)
MCHC RBC AUTO-ENTMCNC: 33.3 G/DL (ref 31.5–35.7)
MCV RBC AUTO: 94.8 FL (ref 79–97)
MONOCYTES # BLD AUTO: 0.6 10*3/MM3 (ref 0.1–0.9)
MONOCYTES NFR BLD AUTO: 7.3 % (ref 5–12)
NEUTROPHILS NFR BLD AUTO: 4.06 10*3/MM3 (ref 1.7–7)
NEUTROPHILS NFR BLD AUTO: 49.6 % (ref 42.7–76)
NRBC BLD AUTO-RTO: 0 /100 WBC (ref 0–0.2)
PERCENT MAX PREDICTED HR: 57.3 %
PHOSPHATE SERPL-MCNC: 3.8 MG/DL (ref 2.5–4.5)
PLATELET # BLD AUTO: 265 10*3/MM3 (ref 140–450)
PMV BLD AUTO: 9.5 FL (ref 6–12)
POTASSIUM SERPL-SCNC: 3.8 MMOL/L (ref 3.5–5.2)
RBC # BLD AUTO: 4.63 10*6/MM3 (ref 4.14–5.8)
SODIUM SERPL-SCNC: 139 MMOL/L (ref 136–145)
STRESS BASELINE BP: NORMAL MMHG
STRESS BASELINE HR: 94 BPM
STRESS O2 SAT REST: 93 %
STRESS PERCENT HR: 67 %
STRESS POST O2 SAT PEAK: 95 %
STRESS POST PEAK BP: NORMAL MMHG
STRESS POST PEAK HR: 106 BPM
STRESS TARGET HR: 157 BPM
TROPONIN T SERPL HS-MCNC: 19 NG/L
WBC NRBC COR # BLD AUTO: 8.18 10*3/MM3 (ref 3.4–10.8)

## 2023-12-11 PROCEDURE — 93018 CV STRESS TEST I&R ONLY: CPT | Performed by: INTERNAL MEDICINE

## 2023-12-11 PROCEDURE — 25010000002 REGADENOSON 0.4 MG/5ML SOLUTION: Performed by: STUDENT IN AN ORGANIZED HEALTH CARE EDUCATION/TRAINING PROGRAM

## 2023-12-11 PROCEDURE — 85025 COMPLETE CBC W/AUTO DIFF WBC: CPT | Performed by: FAMILY MEDICINE

## 2023-12-11 PROCEDURE — 78452 HT MUSCLE IMAGE SPECT MULT: CPT

## 2023-12-11 PROCEDURE — 83735 ASSAY OF MAGNESIUM: CPT | Performed by: STUDENT IN AN ORGANIZED HEALTH CARE EDUCATION/TRAINING PROGRAM

## 2023-12-11 PROCEDURE — 93016 CV STRESS TEST SUPVJ ONLY: CPT | Performed by: NURSE PRACTITIONER

## 2023-12-11 PROCEDURE — 25010000002 FUROSEMIDE PER 20 MG: Performed by: FAMILY MEDICINE

## 2023-12-11 PROCEDURE — 93017 CV STRESS TEST TRACING ONLY: CPT

## 2023-12-11 PROCEDURE — 80048 BASIC METABOLIC PNL TOTAL CA: CPT | Performed by: FAMILY MEDICINE

## 2023-12-11 PROCEDURE — 99232 SBSQ HOSP IP/OBS MODERATE 35: CPT | Performed by: STUDENT IN AN ORGANIZED HEALTH CARE EDUCATION/TRAINING PROGRAM

## 2023-12-11 PROCEDURE — 84484 ASSAY OF TROPONIN QUANT: CPT | Performed by: STUDENT IN AN ORGANIZED HEALTH CARE EDUCATION/TRAINING PROGRAM

## 2023-12-11 PROCEDURE — A9502 TC99M TETROFOSMIN: HCPCS | Performed by: STUDENT IN AN ORGANIZED HEALTH CARE EDUCATION/TRAINING PROGRAM

## 2023-12-11 PROCEDURE — 84100 ASSAY OF PHOSPHORUS: CPT | Performed by: STUDENT IN AN ORGANIZED HEALTH CARE EDUCATION/TRAINING PROGRAM

## 2023-12-11 PROCEDURE — 99222 1ST HOSP IP/OBS MODERATE 55: CPT | Performed by: INTERNAL MEDICINE

## 2023-12-11 PROCEDURE — 0 TECHNETIUM TETROFOSMIN KIT: Performed by: STUDENT IN AN ORGANIZED HEALTH CARE EDUCATION/TRAINING PROGRAM

## 2023-12-11 PROCEDURE — 25010000002 ENOXAPARIN PER 10 MG: Performed by: FAMILY MEDICINE

## 2023-12-11 PROCEDURE — 78452 HT MUSCLE IMAGE SPECT MULT: CPT | Performed by: INTERNAL MEDICINE

## 2023-12-11 RX ORDER — CARVEDILOL 6.25 MG/1
6.25 TABLET ORAL 2 TIMES DAILY WITH MEALS
Status: DISCONTINUED | OUTPATIENT
Start: 2023-12-11 | End: 2023-12-12 | Stop reason: HOSPADM

## 2023-12-11 RX ORDER — REGADENOSON 0.08 MG/ML
0.4 INJECTION, SOLUTION INTRAVENOUS
Status: COMPLETED | OUTPATIENT
Start: 2023-12-11 | End: 2023-12-11

## 2023-12-11 RX ORDER — SPIRONOLACTONE 25 MG/1
25 TABLET ORAL DAILY
Status: DISCONTINUED | OUTPATIENT
Start: 2023-12-11 | End: 2023-12-12 | Stop reason: HOSPADM

## 2023-12-11 RX ADMIN — ENOXAPARIN SODIUM 40 MG: 100 INJECTION SUBCUTANEOUS at 09:14

## 2023-12-11 RX ADMIN — SACUBITRIL AND VALSARTAN 1 TABLET: 24; 26 TABLET, FILM COATED ORAL at 20:51

## 2023-12-11 RX ADMIN — TETROFOSMIN 1 DOSE: 1.38 INJECTION, POWDER, LYOPHILIZED, FOR SOLUTION INTRAVENOUS at 11:52

## 2023-12-11 RX ADMIN — CARVEDILOL 6.25 MG: 6.25 TABLET, FILM COATED ORAL at 18:14

## 2023-12-11 RX ADMIN — NICOTINE 1 PATCH: 21 PATCH, EXTENDED RELEASE TRANSDERMAL at 09:15

## 2023-12-11 RX ADMIN — REGADENOSON 0.4 MG: 0.08 INJECTION, SOLUTION INTRAVENOUS at 11:52

## 2023-12-11 RX ADMIN — TETROFOSMIN 1 DOSE: 1.38 INJECTION, POWDER, LYOPHILIZED, FOR SOLUTION INTRAVENOUS at 10:39

## 2023-12-11 RX ADMIN — SPIRONOLACTONE 25 MG: 25 TABLET ORAL at 14:06

## 2023-12-11 RX ADMIN — Medication 10 ML: at 20:51

## 2023-12-11 RX ADMIN — FUROSEMIDE 40 MG: 10 INJECTION, SOLUTION INTRAMUSCULAR; INTRAVENOUS at 09:14

## 2023-12-11 RX ADMIN — FUROSEMIDE 40 MG: 10 INJECTION, SOLUTION INTRAMUSCULAR; INTRAVENOUS at 18:14

## 2023-12-11 RX ADMIN — Medication 10 ML: at 09:14

## 2023-12-11 NOTE — PROGRESS NOTES
[facility]   Hospitalist Progress Note  Date: 2023  Patient Name: Osmel Powell  : 1990  MRN: [id number]  Date of admission: 2023  Room/Bed: 402/1      Subjective   Subjective     Chief Complaint: Worsening shortness of breath and lower extremity edema    Summary:Osmel Powell is a 33 y.o. male with past medical history of anxiety, depression and IV drug use who presented with complaints of worsening shortness of breath and lower extremity edema.  Patient has been having shortness of breath for past few weeks which has been worsening and present even while walking across the room.  It was associated with lower extremity edema and orthopnea.  Patient got concerned and presented to ED for further evaluation.  Patient stated he had used IV drug methamphetamine about 2 days back.  Also smokes half a pack a day.  Denied alcohol consumption.  On presentation, patient was tachycardic.  Drug screen was positive for THC and methamphetamine.  proBNP was elevated with a value of 1969.  Troponin of 22.  Chest x-ray suggestive of pulmonary edema.  Patient was started on IV Lasix and was admitted for further evaluation and management.    Interval Followup: No acute events overnight.  Stated his shortness of breath has improving since admission.  Denied any fever, chills, rigors, chest pain, abdominal pain, lightheadedness, nausea or vomiting.  Discussed his Echo and stress test result.    Review of Systems    All systems reviewed and negative except for what is outlined above.      Objective   Objective     Vitals:   Temp:  [97.3 °F (36.3 °C)-98.2 °F (36.8 °C)] 97.5 °F (36.4 °C)  Heart Rate:  [] 92  Resp:  [18-20] 20  BP: (107-147)/(73-99) 115/73    Physical Exam   General: Awake, alert, NAD  Cardiovascular: RRR, no murmurs; 1+ lower extremity pitting edema bilaterally.  Pulmonary: CTA bilaterally; no wheezes; no conversational dyspnea  Gastrointestinal: S/ND/NT, +BS  Neuro: Alert, awake, oriented  x 3 speech clear; no tremor  : No Sanchez catheter; no suprapubic tenderness    Result Review    Result Review:  I have personally reviewed these results:  [x]  Laboratory      Lab 12/11/23  0434 12/10/23  0027 12/09/23  2116   WBC 8.18  --  7.58   HEMOGLOBIN 14.6  --  12.7*   HEMATOCRIT 43.9  --  40.1   PLATELETS 265  --  232   NEUTROS ABS 4.06  --  4.63   IMMATURE GRANS (ABS) 0.01  --  0.01   LYMPHS ABS 3.27*  --  2.39   MONOS ABS 0.60  --  0.43   EOS ABS 0.16  --  0.07   MCV 94.8  --  97.6*   CRP  --   --  0.64*   PROCALCITONIN  --   --  0.06   LACTATE  --  1.1  --          Lab 12/11/23 0434 12/09/23 2116   SODIUM 139 140   POTASSIUM 3.8 4.2   CHLORIDE 101 105   CO2 26.6 23.8   ANION GAP 11.4 11.2   BUN 16 19   CREATININE 1.17 1.35*   EGFR 83.4 70.2   GLUCOSE 86 114*   CALCIUM 9.1 9.0   MAGNESIUM 2.2 2.1   PHOSPHORUS 3.8  --    TSH  --  1.180         Lab 12/09/23 2116   TOTAL PROTEIN 6.2   ALBUMIN 3.8   GLOBULIN 2.4   ALT (SGPT) 22   AST (SGOT) 23   BILIRUBIN 1.0   ALK PHOS 101         Lab 12/11/23 0434 12/09/23 2116   PROBNP  --  1,967.0*   HSTROP T 19 22*                 Brief Urine Lab Results       None          [x]  Microbiology   Microbiology Results (last 10 days)       Procedure Component Value - Date/Time    Blood Culture - Blood, Arm, Left [[id number]]  (Normal) Collected: 12/10/23 0027    Lab Status: Preliminary result Specimen: Blood from Arm, Left Updated: 12/11/23 0045     Blood Culture No growth at 24 hours    Blood Culture - Blood, Arm, Left [[id number]]  (Normal) Collected: 12/10/23 0027    Lab Status: Preliminary result Specimen: Blood from Arm, Left Updated: 12/11/23 0045     Blood Culture No growth at 24 hours    COVID-19, FLU A/B, RSV PCR 1 HR TAT - Swab, Nasopharynx [[id number]]  (Normal) Collected: 12/09/23 2050    Lab Status: Final result Specimen: Swab from Nasopharynx Updated: 12/09/23 2200     COVID19 Not Detected     Influenza A PCR Not Detected     Influenza B PCR Not Detected      RSV, PCR Not Detected    Narrative:      Fact sheet for providers: https://www.fda.gov/media/890766/download    Fact sheet for patients: https://www.fda.gov/media/214203/download    Test performed by PCR.          [x]  Radiology  XR Chest 1 View    Result Date: 12/9/2023   Bilateral infiltrates are seen.  The findings may represent pulmonary edema with vascular congestion.  Infectious multifocal pneumonia is thought to be less likely.       Please note that portions of this note were completed with a voice recognition program.  JONATHAN FABIAN JR, MD       Electronically Signed and Approved By: JONATHAN FABIAN JR, MD on 12/09/2023 at 23:11             []  EKG/Telemetry   []  Cardiology/Vascular   []  Pathology  []  Old records  []  Other:    Assessment & Plan   Assessment / Plan     Assessment:  Shortness of breath on exertion  Acute pulmonary edema  Lower extremity edema  IV drug user  Active tobacco user  Depression/anxiety    Plan:  Patient is currently being managed in hospitalist service.  S/p 1 dose of IV Lasix 80 mg in the ED.  Currently on 40 mg IV Lasix every 12 hours.  Transthoracic echo on 12/10 showed EF less than 20%, moderately dilated left ventricular cavity, left atrial cavity moderately dilated and moderately elevated right ventricular systolic pressure along with small pericardial effusion.  Underwent stress test with  myocardial perfusion today on 12/11 which showed EF of 9%, global hypokinesis of the LV wall consistent with nonischemic dilated cardiomyopathy; normal myocardial perfusion study with no evidence of ischemia.  Started on carvedilol 6.25 mg twice daily, Entresto 24-26 mg 1 tablet every 12 hours along with Aldactone 25 mg daily for GDMT.  Cardiology following the patient.  Appreciate further recommendation.  Given it is nonischemic cardiomyopathy, cardiology not recommending LifeVest as there are no documented ventricular arrhythmia with it.  Outpatient follow-up with  cardiology.  Discussion regarding abstinence from drugs and risk of not doing so was strictly discussed at bedside.  On nicotine patch for active tobacco user, smokes about half a pack a day.  Currently saturating well on room air.  On cardiac telemetry.  Continue daily weights.  Continue to monitor input and output strictly.  Continue rest of the current management.  Likely discharge in next 24 hours.      DVT prophylaxis:  Medical DVT prophylaxis orders are present.    CODE STATUS:   Level Of Support Discussed With: Patient  Code Status (Patient has no pulse and is not breathing): CPR (Attempt to Resuscitate)  Medical Interventions (Patient has pulse or is breathing): Full Support      Electronically signed by Elaina Tavares MD, 12/11/23, 8:14 AM EST.

## 2023-12-11 NOTE — PLAN OF CARE
Goal Outcome Evaluation:      No acute changes throughout shift today, vss, up ad shantell, stress test today, diet restarted per Cardiology, continue plan of care.

## 2023-12-11 NOTE — CONSULTS
Cardiology Consult Note  [facility] 4TH FLOOR MEDICAL TELEMETRY UNIT          Patient Identification:  Osmel Powell      [id number]  33 y.o.        male  1990       Date of Consultation: 12/11/2023    Reason for Consultation: New onset heart failure    PCP: Provider, No Known  Primary cardiologist: None    History of Present Illness:     33-year-old white male.  He has a longstanding history of methamphetamine use over the past 3 years.  He has been in a treatment program before but has since relapsed.  Over the past 5 days before admission he has increased shortness of breath, and noticeable lower extremity edema.  Some orthopnea.  On admission his chest x-ray showed cardiomegaly and evidence of pulmonary edema, elevated BNP.  Subsequent echo shows severe global cardiomyopathy ejection fraction 20%.  The patient denies chest pain.  No family history of early heart failure or heart disease.  He is a light smoker.    Past History:  History reviewed. No pertinent past medical history.  Past Surgical History:   Procedure Laterality Date    ADENOIDECTOMY      TONSILLECTOMY       No Known Allergies  Social History     Socioeconomic History    Marital status: Single   Tobacco Use    Smoking status: Every Day     Packs/day: 0.50     Years: 15.00     Additional pack years: 0.00     Total pack years: 7.50     Types: Cigarettes    Smokeless tobacco: Never   Vaping Use    Vaping Use: Never used   Substance and Sexual Activity    Alcohol use: Never    Drug use: Yes     Types: Marijuana     Comment: hx IV meth     History reviewed. No pertinent family history.  Medications:  No medications prior to admission.     Current medications:  enoxaparin, 40 mg, Subcutaneous, Daily  furosemide, 40 mg, Intravenous, Q12H  metoprolol succinate XL, 25 mg, Oral, Q24H  nicotine, 1 patch, Transdermal, Q24H  sacubitril-valsartan, 1 tablet, Oral, Q12H  senna-docusate sodium, 2 tablet, Oral, BID  sodium chloride, 10 mL,  "Intravenous, Q12H      Current IV drips:       Review of Systems   Constitutional: Positive for malaise/fatigue.   HENT: Negative.     Eyes: Negative.    Cardiovascular:  Positive for dyspnea on exertion and leg swelling. Negative for chest pain.   Respiratory:  Positive for shortness of breath.    Endocrine: Negative.    Hematologic/Lymphatic: Negative.    Skin: Negative.    Musculoskeletal: Negative.    Gastrointestinal: Negative.    Genitourinary: Negative.    Neurological: Negative.    Psychiatric/Behavioral: Negative.           Physical exam:    /89 (BP Location: Right arm, Patient Position: Lying)   Pulse 104   Temp 97.5 °F (36.4 °C) (Oral)   Resp 18   Ht 177.8 cm (70\")   Wt 89.2 kg (196 lb 10.4 oz)   SpO2 94%   BMI 28.22 kg/m²  Body mass index is 28.22 kg/m².   SpO2  Min: 94 %  Max: 98 %    General Appearance:   well developed  well nourished  HENT:   oropharynx moist  lips not cyanotic  Neck:  thyroid not enlarged  supple  Respiratory:  no respiratory distress  normal breath sounds  no rales  Cardiovascular:  no jugular venous distention  regular rhythm  apical impulse lateral  S1 normal, S2 normal  no S3, no S4   no murmur  no rub, no thrill  carotid pulses normal; no bruit  pedal pulses normal  lower extremity edema: Trace  Gastrointestinal:   bowel sounds normal  non-tender  no hepatomegaly, no splenomegaly  Musculoskeletal:  no clubbing of fingers.   normocephalic, head atraumatic  Skin:   warm, dry  Neuro/Psychiatric:  judgement and insight appropriate  normal mood and affect    Cardiographics:     ECG  (personally reviewed) reviewed by me, sinus tachycardia, left axis, nonspecific T wave changes, no previous for comparison   Telemetry:  (personally reviewed)    ECHO:   Results for orders placed during the hospital encounter of 12/09/23    Adult Transthoracic Echo Complete w/ Color, Spectral and Contrast if necessary per protocol    Interpretation Summary    Left ventricular ejection " "fraction appears to be less than 20%.    The left ventricular cavity is moderately dilated.    The left atrial cavity is moderately dilated.    Estimated right ventricular systolic pressure from tricuspid regurgitation is moderately elevated (45-55 mmHg).    There is a small (<1cm) pericardial effusion.       CATH:     CARDIOLITE:      Lab Review:       Results from last 7 days   Lab Units 12/11/23  0434 12/09/23 2116   WBC 10*3/mm3 8.18 7.58   HEMOGLOBIN g/dL 14.6 12.7*   HEMATOCRIT % 43.9 40.1            Results from last 7 days   Lab Units 12/11/23  0434 12/09/23 2116   SODIUM mmol/L 139 140   BUN mg/dL 16 19   CREATININE mg/dL 1.17 1.35*   GLUCOSE mg/dL 86 114*      Estimated Creatinine Clearance: 99.1 mL/min (by C-G formula based on SCr of 1.17 mg/dL).         Invalid input(s): \"LDLCALC\"          Lab Results   Component Value Date    TSH 1.180 12/09/2023      No results found for: \"HGBA1C\"        No results found for: \"DIGOXIN\"   No components found for: \"DDIMERQUAN\"     Imaging:   XR Chest 1 View    Result Date: 12/9/2023  PROCEDURE: XR CHEST 1 VW  COMPARISON: None.  INDICATIONS: SOA/SOB/shortness of air/shortness of breath.  FINDINGS: A single AP upright portable view of the chest is provided for review.  Bilateral infiltrates are seen.  The findings may represent infectious multifocal pneumonia.  Pulmonary edema is possible.  There is mild-to-moderate cardiomegaly.  No pneumothorax.  No pneumomediastinum.  No pleural effusion.  There is slight pulmonary hypoinflation.  External artifacts obscure detail.      Impression:  Bilateral infiltrates are seen.  The findings may represent pulmonary edema with vascular congestion.  Infectious multifocal pneumonia is thought to be less likely.       Please note that portions of this note were completed with a voice recognition program.  JONATHAN FABIAN JR, MD       Electronically Signed and Approved By: JONATHAN FABIAN JR, MD on 12/09/2023 at 23:11                 The " ASCVD Risk score (Cristian GALLO, et al., 2019) failed to calculate for the following reasons:    The 2019 ASCVD risk score is only valid for ages 40 to 79      Assessment:      Dyspnea on exertion    IV drug user    Acute pulmonary edema      Initial cardiac assessment: 33-year-old white male longstanding methamphetamine user presents with increased shortness of breath and volume overload.  Echo shows severe global cardiomyopathy.  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  He has responded well to initial diuretic therapy and starting guideline directed medical therapy.      Recommendations:  1.  Changed to carvedilol  2.  Continue Entresto  3.  Add Aldactone  4.  Stress imaging today to evaluate for possible underlying ischemia which is unlikely given the clinical scenario and young age of the patient  5.  I had a lengthy discussion with him today he needs to get plugged back into drug recovery program it is critical long-term abstinence from methamphetamine.  Otherwise his cardiomyopathy will not improve.  Time will tell if this is chronic or partially reversible but he must be free of drug use for any chance of significant recovery.  6.  If no significant ischemia and medical therapy stable possibly discharge by tomorrow with outpatient follow-up                Matthew Parmar MD  12/11/2023    10:05 EST

## 2023-12-12 ENCOUNTER — TELEPHONE (OUTPATIENT)
Dept: CARDIOLOGY | Facility: CLINIC | Age: 33
End: 2023-12-12

## 2023-12-12 ENCOUNTER — READMISSION MANAGEMENT (OUTPATIENT)
Dept: CALL CENTER | Facility: HOSPITAL | Age: 33
End: 2023-12-12
Payer: MEDICAID

## 2023-12-12 VITALS
HEIGHT: 70 IN | OXYGEN SATURATION: 95 % | WEIGHT: 195.33 LBS | DIASTOLIC BLOOD PRESSURE: 75 MMHG | RESPIRATION RATE: 18 BRPM | SYSTOLIC BLOOD PRESSURE: 130 MMHG | TEMPERATURE: 97.3 F | HEART RATE: 100 BPM | BODY MASS INDEX: 27.96 KG/M2

## 2023-12-12 LAB
ANION GAP SERPL CALCULATED.3IONS-SCNC: 8.3 MMOL/L (ref 5–15)
BASOPHILS # BLD AUTO: 0.07 10*3/MM3 (ref 0–0.2)
BASOPHILS NFR BLD AUTO: 0.7 % (ref 0–1.5)
BUN SERPL-MCNC: 16 MG/DL (ref 6–20)
BUN/CREAT SERPL: 13.9 (ref 7–25)
CALCIUM SPEC-SCNC: 9.1 MG/DL (ref 8.6–10.5)
CHLORIDE SERPL-SCNC: 100 MMOL/L (ref 98–107)
CO2 SERPL-SCNC: 27.7 MMOL/L (ref 22–29)
CREAT SERPL-MCNC: 1.15 MG/DL (ref 0.76–1.27)
DEPRECATED RDW RBC AUTO: 43.7 FL (ref 37–54)
EGFRCR SERPLBLD CKD-EPI 2021: 85.1 ML/MIN/1.73
EOSINOPHIL # BLD AUTO: 0.21 10*3/MM3 (ref 0–0.4)
EOSINOPHIL NFR BLD AUTO: 2 % (ref 0.3–6.2)
ERYTHROCYTE [DISTWIDTH] IN BLOOD BY AUTOMATED COUNT: 12.7 % (ref 12.3–15.4)
GLUCOSE SERPL-MCNC: 102 MG/DL (ref 65–99)
HCT VFR BLD AUTO: 47.3 % (ref 37.5–51)
HGB BLD-MCNC: 15.5 G/DL (ref 13–17.7)
IMM GRANULOCYTES # BLD AUTO: 0.03 10*3/MM3 (ref 0–0.05)
IMM GRANULOCYTES NFR BLD AUTO: 0.3 % (ref 0–0.5)
LYMPHOCYTES # BLD AUTO: 2.83 10*3/MM3 (ref 0.7–3.1)
LYMPHOCYTES NFR BLD AUTO: 26.8 % (ref 19.6–45.3)
MAGNESIUM SERPL-MCNC: 2.1 MG/DL (ref 1.6–2.6)
MCH RBC QN AUTO: 30.8 PG (ref 26.6–33)
MCHC RBC AUTO-ENTMCNC: 32.8 G/DL (ref 31.5–35.7)
MCV RBC AUTO: 93.8 FL (ref 79–97)
MONOCYTES # BLD AUTO: 0.64 10*3/MM3 (ref 0.1–0.9)
MONOCYTES NFR BLD AUTO: 6.1 % (ref 5–12)
NEUTROPHILS NFR BLD AUTO: 6.77 10*3/MM3 (ref 1.7–7)
NEUTROPHILS NFR BLD AUTO: 64.1 % (ref 42.7–76)
NRBC BLD AUTO-RTO: 0 /100 WBC (ref 0–0.2)
PHOSPHATE SERPL-MCNC: 4 MG/DL (ref 2.5–4.5)
PLATELET # BLD AUTO: 271 10*3/MM3 (ref 140–450)
PMV BLD AUTO: 9 FL (ref 6–12)
POTASSIUM SERPL-SCNC: 4.1 MMOL/L (ref 3.5–5.2)
RBC # BLD AUTO: 5.04 10*6/MM3 (ref 4.14–5.8)
SODIUM SERPL-SCNC: 136 MMOL/L (ref 136–145)
WBC NRBC COR # BLD AUTO: 10.55 10*3/MM3 (ref 3.4–10.8)

## 2023-12-12 PROCEDURE — 99239 HOSP IP/OBS DSCHRG MGMT >30: CPT | Performed by: INTERNAL MEDICINE

## 2023-12-12 PROCEDURE — 84100 ASSAY OF PHOSPHORUS: CPT | Performed by: STUDENT IN AN ORGANIZED HEALTH CARE EDUCATION/TRAINING PROGRAM

## 2023-12-12 PROCEDURE — 25010000002 FUROSEMIDE PER 20 MG: Performed by: FAMILY MEDICINE

## 2023-12-12 PROCEDURE — 85025 COMPLETE CBC W/AUTO DIFF WBC: CPT | Performed by: STUDENT IN AN ORGANIZED HEALTH CARE EDUCATION/TRAINING PROGRAM

## 2023-12-12 PROCEDURE — 80048 BASIC METABOLIC PNL TOTAL CA: CPT | Performed by: FAMILY MEDICINE

## 2023-12-12 PROCEDURE — 25010000002 ENOXAPARIN PER 10 MG: Performed by: FAMILY MEDICINE

## 2023-12-12 PROCEDURE — 83735 ASSAY OF MAGNESIUM: CPT | Performed by: STUDENT IN AN ORGANIZED HEALTH CARE EDUCATION/TRAINING PROGRAM

## 2023-12-12 PROCEDURE — 99232 SBSQ HOSP IP/OBS MODERATE 35: CPT | Performed by: INTERNAL MEDICINE

## 2023-12-12 RX ORDER — FUROSEMIDE 40 MG/1
40 TABLET ORAL DAILY
Qty: 30 TABLET | Refills: 3 | Status: SHIPPED | OUTPATIENT
Start: 2023-12-12

## 2023-12-12 RX ORDER — CARVEDILOL 6.25 MG/1
6.25 TABLET ORAL 2 TIMES DAILY WITH MEALS
Qty: 60 TABLET | Refills: 3 | Status: SHIPPED | OUTPATIENT
Start: 2023-12-12

## 2023-12-12 RX ORDER — FUROSEMIDE 40 MG/1
40 TABLET ORAL DAILY
Status: DISCONTINUED | OUTPATIENT
Start: 2023-12-12 | End: 2023-12-12 | Stop reason: HOSPADM

## 2023-12-12 RX ORDER — SPIRONOLACTONE 25 MG/1
25 TABLET ORAL DAILY
Qty: 30 TABLET | Refills: 3 | Status: SHIPPED | OUTPATIENT
Start: 2023-12-13

## 2023-12-12 RX ADMIN — CARVEDILOL 6.25 MG: 6.25 TABLET, FILM COATED ORAL at 08:13

## 2023-12-12 RX ADMIN — SACUBITRIL AND VALSARTAN 1 TABLET: 24; 26 TABLET, FILM COATED ORAL at 08:12

## 2023-12-12 RX ADMIN — FUROSEMIDE 40 MG: 10 INJECTION, SOLUTION INTRAMUSCULAR; INTRAVENOUS at 08:12

## 2023-12-12 RX ADMIN — DOCUSATE SODIUM 50MG AND SENNOSIDES 8.6MG 2 TABLET: 8.6; 5 TABLET, FILM COATED ORAL at 08:12

## 2023-12-12 RX ADMIN — SPIRONOLACTONE 25 MG: 25 TABLET ORAL at 08:13

## 2023-12-12 RX ADMIN — ENOXAPARIN SODIUM 40 MG: 100 INJECTION SUBCUTANEOUS at 08:11

## 2023-12-12 RX ADMIN — Medication 10 ML: at 08:12

## 2023-12-12 RX ADMIN — NICOTINE 1 PATCH: 21 PATCH, EXTENDED RELEASE TRANSDERMAL at 08:13

## 2023-12-12 NOTE — PLAN OF CARE
Phase 1 - Admission/Acute - Current (Heart Failure Pathway)  Initiate Guideline Directed Medical Therapy (ex.ACE, ARBs, ARNI, Beta Blockers, SGLT2 Inhibitors).  Outcome: Met     Problem: Adjustment to Illness (Heart Failure)  Goal: Optimal Coping  12/12/2023 1243 by Nelly Mcgrath RN  Outcome: Met  12/12/2023 1243 by Nelly Mcgrath RN  Outcome: Ongoing, Progressing  Intervention: Support Psychosocial Response  Recent Flowsheet Documentation  Taken 12/12/2023 0900 by Nelly Mcgrath RN  Supportive Measures: self-care encouraged  Family/Support System Care: self-care encouraged     Problem: Cardiac Output Decreased (Heart Failure)  Goal: Optimal Cardiac Output  12/12/2023 1243 by Nelly Mcgrath RN  Outcome: Met  12/12/2023 1243 by Nelly Mcgrath RN  Outcome: Ongoing, Progressing  Intervention: Optimize Cardiac Output  Recent Flowsheet Documentation  Taken 12/12/2023 0900 by Nelly Mcgrath RN  Environmental Support: calm environment promoted     Problem: Dysrhythmia (Heart Failure)  Goal: Stable Heart Rate and Rhythm  12/12/2023 1243 by Nelly Mcgrath RN  Outcome: Met  12/12/2023 1243 by Nelly Mcgrath RN  Outcome: Ongoing, Progressing     Problem: Fluid Imbalance (Heart Failure)  Goal: Fluid Balance  12/12/2023 1243 by Nelly Mcgrath RN  Outcome: Met  12/12/2023 1243 by Nelly Mcgrath RN  Outcome: Ongoing, Progressing     Problem: Functional Ability Impaired (Heart Failure)  Goal: Optimal Functional Ability  12/12/2023 1243 by Nelly Mcgrath RN  Outcome: Met  12/12/2023 1243 by Nelly Mcgrath RN  Outcome: Ongoing, Progressing  Intervention: Optimize Functional Ability  Recent Flowsheet Documentation  Taken 12/12/2023 0900 by Nelly Mcgrath RN  Activity Management: up ad shantell     Problem: Oral Intake Inadequate (Heart Failure)  Goal: Optimal Nutrition Intake  12/12/2023 1243 by Nelly Mcgrath RN  Outcome: Met  12/12/2023 1243 by Nelly Mcgrath RN  Outcome: Ongoing,  Progressing     Problem: Respiratory Compromise (Heart Failure)  Goal: Effective Oxygenation and Ventilation  12/12/2023 1243 by Nelly Mcgrath RN  Outcome: Met  12/12/2023 1243 by Nelly Mcgrath RN  Outcome: Ongoing, Progressing  Intervention: Promote Airway Secretion Clearance  Recent Flowsheet Documentation  Taken 12/12/2023 0900 by Nelly Mcgrath RN  Cough And Deep Breathing: done independently per patient  Intervention: Optimize Oxygenation and Ventilation  Recent Flowsheet Documentation  Taken 12/12/2023 0900 by Nelly Mcgrath RN  Airway/Ventilation Management: airway patency maintained     Problem: Sleep Disordered Breathing (Heart Failure)  Goal: Effective Breathing Pattern During Sleep  12/12/2023 1243 by Nelly Mcgrath RN  Outcome: Met  12/12/2023 1243 by Nelly Mcgrath RN  Outcome: Ongoing, Progressing     Problem: Adult Inpatient Plan of Care  Goal: Plan of Care Review  12/12/2023 1243 by Nelly Mcgrath RN  Outcome: Met  12/12/2023 1243 by Nelly Mcgrath RN  Outcome: Ongoing, Progressing  Flowsheets  Taken 12/12/2023 1243 by Nelly Mcgrath RN  Plan of Care Reviewed With: patient  Outcome Evaluation: vss, patient to be dcing today  Taken 12/12/2023 0541 by Taylor Ro LPN  Progress: improving  Goal: Patient-Specific Goal (Individualized)  12/12/2023 1243 by Nelly Mcgrath RN  Outcome: Met  12/12/2023 1243 by Nelly Mcgrath RN  Outcome: Ongoing, Progressing  Goal: Absence of Hospital-Acquired Illness or Injury  12/12/2023 1243 by Nelly Mcgrath RN  Outcome: Met  12/12/2023 1243 by Nelly Mcgrath RN  Outcome: Ongoing, Progressing  Intervention: Identify and Manage Fall Risk  Recent Flowsheet Documentation  Taken 12/12/2023 0900 by Nelly Mcgrath RN  Safety Promotion/Fall Prevention: safety round/check completed  Intervention: Prevent Skin Injury  Recent Flowsheet Documentation  Taken 12/12/2023 0900 by Nelly Mcgrath RN  Body Position: position changed  independently  Intervention: Prevent and Manage VTE (Venous Thromboembolism) Risk  Recent Flowsheet Documentation  Taken 12/12/2023 0900 by Nelly Mcrgath RN  Activity Management: up ad shantell  Goal: Optimal Comfort and Wellbeing  12/12/2023 1243 by Nelly Mcgrath RN  Outcome: Met  12/12/2023 1243 by Nelly Mcgrath RN  Outcome: Ongoing, Progressing  Intervention: Monitor Pain and Promote Comfort  Recent Flowsheet Documentation  Taken 12/12/2023 0900 by Nelly Mcgrath RN  Pain Management Interventions:   pillow support provided   position adjusted  Intervention: Provide Person-Centered Care  Recent Flowsheet Documentation  Taken 12/12/2023 0900 by Nelly Mcgrath RN  Trust Relationship/Rapport:   care explained   choices provided   emotional support provided   empathic listening provided   questions encouraged   questions answered   reassurance provided   thoughts/feelings acknowledged  Goal: Readiness for Transition of Care  12/12/2023 1243 by Nelly Mcgrath RN  Outcome: Met  12/12/2023 1243 by Nelly Mcgrath RN  Outcome: Ongoing, Progressing   Goal Outcome Evaluation:  Plan of Care Reviewed With: patient           Outcome Evaluation: vss, patient to be dcing today

## 2023-12-12 NOTE — TELEPHONE ENCOUNTER
Caller: [facility] Evelia PRUITT    Relationship to patient:     Best call back number: [phone] PATIENT'S NUMBER    Chief complaint: DYSPNEA ON EXERTION    Type of visit: HOSPITAL FOLLOW UP    Requested date: 2 WEEKS     If rescheduling, when is the original appointment:      Additional notes: DR. ZHENG NOR TAMI HERRERA HAD ANY APPOINTMENTS IN 2 WEEKS. DR. ZHENG CONSULTED IN HOSPITAL.

## 2023-12-12 NOTE — PLAN OF CARE
Goal Outcome Evaluation:  Plan of Care Reviewed With: patient        Progress: improving  Outcome Evaluation: VSS. Pt states edema improving. No complaints at this time.

## 2023-12-12 NOTE — TELEPHONE ENCOUNTER
RECEIVED MESSAGE THAT PT NEEDED A 2 WEEK HOSP FOLLOW UP. CALLED PT TO SCHEDULE APPT NO ANSWER LEFT MESSAGE REQUESTING A CB.

## 2023-12-12 NOTE — PROGRESS NOTES
CARDIOLOGY  INPATIENT PROGRESS NOTE         [facility] 4TH FLOOR MEDICAL TELEMETRY UNIT    12/12/2023      PATIENT IDENTIFICATION:   Name:  Osmel Powell      MRN:  [id number]     33 y.o.  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  Nuclear stress test yesterday did not show evidence of ischemia on the perfusion study with severely reduced left ventricular function.  OBJECTIVE:  Vitals:    12/12/23 0009 12/12/23 0450 12/12/23 0600 12/12/23 0708   BP: 107/76 117/73  128/83   BP Location: Left arm Left arm  Right arm   Patient Position: Lying Lying  Lying   Pulse:    110   Resp: 16 16  18   Temp: 97.5 °F (36.4 °C) 97.4 °F (36.3 °C)  97.5 °F (36.4 °C)   TempSrc: Oral Oral  Oral   SpO2: 94% 98%  97%   Weight:   88.6 kg (195 lb 5.2 oz)    Height:               Body mass index is 28.03 kg/m².    Intake/Output Summary (Last 24 hours) at 12/12/2023 0853  Last data filed at 12/11/2023 1700  Gross per 24 hour   Intake --   Output 1600 ml   Net -1600 ml       Telemetry: Sinus rhythm, no arrhythmias observed    Review of Systems   Respiratory:  Negative for cough, chest tightness and shortness of breath.    Cardiovascular:  Negative for chest pain.         Exam:  General appearance no acute distress    well nourished   HEENT sclerae non-icteric    lips not cyanotic   Respiratory rate and depth normal    normal breath sounds    no rales, no wheeze   Cardiovascular JVP normal    regular rhythm    S1 normal, S2 normal    no S3, no S4    no murmur    lower extremity edema:none   Abdominal bowel sounds normal    abdomen soft, non-tender    no hepatosplenomegaly   Neuro-psych oriented to person, place, time    cooperative     No Known Allergies  Scheduled meds:  carvedilol, 6.25 mg, Oral, BID With Meals  enoxaparin, 40 mg, Subcutaneous, Daily  furosemide,  "40 mg, Intravenous, Q12H  nicotine, 1 patch, Transdermal, Q24H  sacubitril-valsartan, 1 tablet, Oral, Q12H  senna-docusate sodium, 2 tablet, Oral, BID  sodium chloride, 10 mL, Intravenous, Q12H  spironolactone, 25 mg, Oral, Daily      IV meds:                         Data Review:  Results from last 7 days   Lab Units 12/12/23 0544 12/11/23 0434 12/09/23 2116   SODIUM mmol/L 136 139 140   BUN mg/dL 16 16 19   CREATININE mg/dL 1.15 1.17 1.35*   GLUCOSE mg/dL 102* 86 114*             Estimated Creatinine Clearance: 100.4 mL/min (by C-G formula based on SCr of 1.15 mg/dL).  Results from last 7 days   Lab Units 12/12/23 0544 12/11/23 0434 12/09/23 2116   WBC 10*3/mm3 10.55 8.18 7.58   HEMOGLOBIN g/dL 15.5 14.6 12.7*         Results from last 7 days   Lab Units 12/09/23 2116   ALT (SGPT) U/L 22   AST (SGOT) U/L 23     No results found for: \"DIGOXIN\"   Lab Results   Component Value Date    TSH 1.180 12/09/2023           Invalid input(s): \"LDLCALC\"            Imaging (last 24 hr):   Imaging Results (Last 24 Hours)       ** No results found for the last 24 hours. **              ASSESSMENT:     Dyspnea on exertion    IV drug user    Acute pulmonary edema      PLAN:  1.  Continue Coreg, Entresto, Aldactone  2.  Change to oral diuretic  3.  Heart failure clinic referral  4.  Okay to discharge today from my standpoint  5.  The patient has been counseled this admission on more than 1 occasion regarding the vital importance of abstinence from drug use.  He is at high risk of sudden cardiac death with methamphetamine use.  He understands this recommendation          Matthew Parmar MD  12/12/2023    08:53 EST           "

## 2023-12-12 NOTE — PLAN OF CARE
Phase 1 - Admission/Acute - Current (Heart Failure Pathway)  Initiate Guideline Directed Medical Therapy (ex.ACE, ARBs, ARNI, Beta Blockers, SGLT2 Inhibitors).  Outcome: Met     Problem: Adjustment to Illness (Heart Failure)  Goal: Optimal Coping  Outcome: Ongoing, Progressing  Intervention: Support Psychosocial Response  Recent Flowsheet Documentation  Taken 12/12/2023 0900 by Nelly Mcgrath RN  Supportive Measures: self-care encouraged  Family/Support System Care: self-care encouraged     Problem: Cardiac Output Decreased (Heart Failure)  Goal: Optimal Cardiac Output  Outcome: Ongoing, Progressing  Intervention: Optimize Cardiac Output  Recent Flowsheet Documentation  Taken 12/12/2023 0900 by Nelly Mcgrath RN  Environmental Support: calm environment promoted     Problem: Dysrhythmia (Heart Failure)  Goal: Stable Heart Rate and Rhythm  Outcome: Ongoing, Progressing     Problem: Fluid Imbalance (Heart Failure)  Goal: Fluid Balance  Outcome: Ongoing, Progressing     Problem: Functional Ability Impaired (Heart Failure)  Goal: Optimal Functional Ability  Outcome: Ongoing, Progressing  Intervention: Optimize Functional Ability  Recent Flowsheet Documentation  Taken 12/12/2023 0900 by Nelly Mcgrath RN  Activity Management: up ad shantell     Problem: Oral Intake Inadequate (Heart Failure)  Goal: Optimal Nutrition Intake  Outcome: Ongoing, Progressing     Problem: Respiratory Compromise (Heart Failure)  Goal: Effective Oxygenation and Ventilation  Outcome: Ongoing, Progressing  Intervention: Promote Airway Secretion Clearance  Recent Flowsheet Documentation  Taken 12/12/2023 0900 by Nelly Mcgrath RN  Cough And Deep Breathing: done independently per patient  Intervention: Optimize Oxygenation and Ventilation  Recent Flowsheet Documentation  Taken 12/12/2023 0900 by Nelly Mcgrath RN  Airway/Ventilation Management: airway patency maintained     Problem: Sleep Disordered Breathing (Heart Failure)  Goal:  Effective Breathing Pattern During Sleep  Outcome: Ongoing, Progressing     Problem: Adult Inpatient Plan of Care  Goal: Plan of Care Review  Outcome: Ongoing, Progressing  Flowsheets  Taken 12/12/2023 1243 by Nelly Mcgrath RN  Plan of Care Reviewed With: patient  Outcome Evaluation: vss, patient to be dcing today  Taken 12/12/2023 0541 by Taylor Ro LPN  Progress: improving  Goal: Patient-Specific Goal (Individualized)  Outcome: Ongoing, Progressing  Goal: Absence of Hospital-Acquired Illness or Injury  Outcome: Ongoing, Progressing  Intervention: Identify and Manage Fall Risk  Recent Flowsheet Documentation  Taken 12/12/2023 0900 by Nelly Mcgrath RN  Safety Promotion/Fall Prevention: safety round/check completed  Intervention: Prevent Skin Injury  Recent Flowsheet Documentation  Taken 12/12/2023 0900 by Nelly Mcgrath RN  Body Position: position changed independently  Intervention: Prevent and Manage VTE (Venous Thromboembolism) Risk  Recent Flowsheet Documentation  Taken 12/12/2023 0900 by Nelly Mcgrath RN  Activity Management: up ad shantell  Goal: Optimal Comfort and Wellbeing  Outcome: Ongoing, Progressing  Intervention: Monitor Pain and Promote Comfort  Recent Flowsheet Documentation  Taken 12/12/2023 0900 by Nelly Mcgrath RN  Pain Management Interventions:   pillow support provided   position adjusted  Intervention: Provide Person-Centered Care  Recent Flowsheet Documentation  Taken 12/12/2023 0900 by Nelly Mcgrath RN  Trust Relationship/Rapport:   care explained   choices provided   emotional support provided   empathic listening provided   questions encouraged   questions answered   reassurance provided   thoughts/feelings acknowledged  Goal: Readiness for Transition of Care  Outcome: Ongoing, Progressing   Goal Outcome Evaluation:  Plan of Care Reviewed With: patient           Outcome Evaluation: vss, patient to be dcing today

## 2023-12-12 NOTE — OUTREACH NOTE
Prep Survey      Flowsheet Row Responses   [facility] facility patient discharged from? [facility]   Is LACE score < 7 ? No   Eligibility Readm Mgmt   Discharge diagnosis Dyspnea on exertion   Does the patient have one of the following disease processes/diagnoses(primary or secondary)? CHF   Does the patient have Home health ordered? No   Is there a DME ordered? No   Medication alerts for this patient see AVS   Prep survey completed? Yes            Regla CARMONA - Registered Nurse

## 2023-12-12 NOTE — DISCHARGE SUMMARY
[facility]         HOSPITALIST  DISCHARGE SUMMARY    Patient Name: Osmel Powell  : 1990  MRN: [id number]    Date of Admission: 2023  Date of Discharge:  2023  Primary Care Physician: Provider, No Known    Consults       Date and Time Order Name Status Description    12/10/2023  5:51 PM Inpatient Cardiology Consult      12/10/2023 12:18 AM Inpatient Hospitalist Consult              Active and Resolved Hospital Problems:  Shortness of breath on exertion  Acute pulmonary edema  Lower extremity edema  IV drug user  Active tobacco user  Depression/anxiety  Acute systolic heart failure secondary to amphetamine use, nonischemic cardiomyopathy    Hospital Course     Hospital Course:  Osmel Powell is a 33 y.o. male with past medical history of anxiety, depression and IV drug use who presented with complaints of worsening shortness of breath and lower extremity edema.  Patient has been having shortness of breath for past few weeks which has been worsening and present even while walking across the room.  It was associated with lower extremity edema and orthopnea.  Patient got concerned and presented to ED for further evaluation.  Patient stated he had used IV drug methamphetamine about 2 days back.  Also smokes half a pack a day.  Denied alcohol consumption.  On presentation, patient was tachycardic.  Drug screen was positive for THC and methamphetamine.  proBNP was elevated with a value of 1967.  Troponin of 22.  Chest x-ray suggestive of pulmonary edema.  Patient was started on IV Lasix and was admitted for further evaluation and management.  Patient underwent ischemic eval which did not show any perfusion deficits, patient was started on Coreg, Entresto and Aldactone, he was placed on oral diuretics.  He was deemed safe to discharge from cardiology standpoint.  He should follow-up with cardiology in 2 weeks.  Patient is discharged home today in stable condition    Day of Discharge      Vital Signs:  Temp:  [97.3 °F (36.3 °C)-97.8 °F (36.6 °C)] 97.3 °F (36.3 °C)  Heart Rate:  [] 100  Resp:  [16-20] 18  BP: (107-130)/(73-83) 130/75    Physical Exam:   GEN: No acute distress  HEENT: Moist mucous membranes  LUNGS: Equal chest rise bilaterally  CARDIAC: Regular rate and rhythm  NEURO: Moving all 4 extremities spontaneously  SKIN: No obvious breakdown    Discharge Details        Discharge Medications        New Medications        Instructions Start Date   carvedilol 6.25 MG tablet  Commonly known as: COREG   6.25 mg, Oral, 2 Times Daily With Meals      furosemide 40 MG tablet  Commonly known as: LASIX   40 mg, Oral, Daily      sacubitril-valsartan 24-26 MG tablet  Commonly known as: ENTRESTO   1 tablet, Oral, Every 12 Hours Scheduled      spironolactone 25 MG tablet  Commonly known as: ALDACTONE   25 mg, Oral, Daily   Start Date: December 13, 2023              No Known Allergies    Discharge Disposition:  Home or Self Care    Diet:  Hospital:  Diet Order   Procedures    Diet: Cardiac Diets, Fluid Restriction (240 mL/tray) Diets; Healthy Heart (2-3 Na+); 1500 mL/day; Texture: Regular Texture (IDDSI 7); Fluid Consistency: Thin (IDDSI 0)       Discharge Activity:       CODE STATUS:  Code Status and Medical Interventions:   Ordered at: 12/10/23 0252     Level Of Support Discussed With:    Patient     Code Status (Patient has no pulse and is not breathing):    CPR (Attempt to Resuscitate)     Medical Interventions (Patient has pulse or is breathing):    Full Support       No future appointments.    Additional Instructions for the Follow-ups that You Need to Schedule       Discharge Follow-up with PCP   As directed       Currently Documented PCP:    Provider, No Known    PCP Phone Number:    None     Follow Up Details: 3 to 7 days        Discharge Follow-up with Specified Provider: cardiology; 2 Weeks   As directed      To: cardiology   Follow Up: 2 Weeks                Pertinent  and/or Most Recent  Results     IMAGING:  Stress Test With Myocardial Perfusion One Day    Result Date: 12/11/2023    Left ventricular ejection fraction is severely reduced (Calculated EF = 9%).   Abnormal LV wall motion consistent with global hypokinesis.  Findings are consistent with nonischemic dilated cardiomyopathy   Myocardial perfusion imaging indicates a normal myocardial perfusion study with no evidence of ischemia.   Impressions are consistent with a study indicating uncertain risk.   Findings consistent with a normal ECG stress test.     Adult Transthoracic Echo Complete w/ Color, Spectral and Contrast if necessary per protocol    Result Date: 12/10/2023    Left ventricular ejection fraction appears to be less than 20%.   The left ventricular cavity is moderately dilated.   The left atrial cavity is moderately dilated.   Estimated right ventricular systolic pressure from tricuspid regurgitation is moderately elevated (45-55 mmHg).   There is a small (<1cm) pericardial effusion.     XR Chest 1 View    Result Date: 12/9/2023  PROCEDURE: XR CHEST 1 VW  COMPARISON: None.  INDICATIONS: SOA/SOB/shortness of air/shortness of breath.  FINDINGS: A single AP upright portable view of the chest is provided for review.  Bilateral infiltrates are seen.  The findings may represent infectious multifocal pneumonia.  Pulmonary edema is possible.  There is mild-to-moderate cardiomegaly.  No pneumothorax.  No pneumomediastinum.  No pleural effusion.  There is slight pulmonary hypoinflation.  External artifacts obscure detail.       Bilateral infiltrates are seen.  The findings may represent pulmonary edema with vascular congestion.  Infectious multifocal pneumonia is thought to be less likely.       Please note that portions of this note were completed with a voice recognition program.  JONATHAN FABIAN JR, MD       Electronically Signed and Approved By: JONATHAN FABIAN JR, MD on 12/09/2023 at 23:11                LAB RESULTS:      Lab  12/12/23  0544 12/11/23  0434 12/10/23  0027 12/09/23 2116   WBC 10.55 8.18  --  7.58   HEMOGLOBIN 15.5 14.6  --  12.7*   HEMATOCRIT 47.3 43.9  --  40.1   PLATELETS 271 265  --  232   NEUTROS ABS 6.77 4.06  --  4.63   IMMATURE GRANS (ABS) 0.03 0.01  --  0.01   LYMPHS ABS 2.83 3.27*  --  2.39   MONOS ABS 0.64 0.60  --  0.43   EOS ABS 0.21 0.16  --  0.07   MCV 93.8 94.8  --  97.6*   CRP  --   --   --  0.64*   PROCALCITONIN  --   --   --  0.06   LACTATE  --   --  1.1  --          Lab 12/12/23  0544 12/11/23 0434 12/09/23 2116   SODIUM 136 139 140   POTASSIUM 4.1 3.8 4.2   CHLORIDE 100 101 105   CO2 27.7 26.6 23.8   ANION GAP 8.3 11.4 11.2   BUN 16 16 19   CREATININE 1.15 1.17 1.35*   EGFR 85.1 83.4 70.2   GLUCOSE 102* 86 114*   CALCIUM 9.1 9.1 9.0   MAGNESIUM 2.1 2.2 2.1   PHOSPHORUS 4.0 3.8  --    TSH  --   --  1.180         Lab 12/09/23 2116   TOTAL PROTEIN 6.2   ALBUMIN 3.8   GLOBULIN 2.4   ALT (SGPT) 22   AST (SGOT) 23   BILIRUBIN 1.0   ALK PHOS 101         Lab 12/11/23 0434 12/09/23 2116   PROBNP  --  1,967.0*   HSTROP T 19 22*                 Brief Urine Lab Results       None          Microbiology Results (last 10 days)       Procedure Component Value - Date/Time    Blood Culture - Blood, Arm, Left [[id number]]  (Normal) Collected: 12/10/23 0027    Lab Status: Preliminary result Specimen: Blood from Arm, Left Updated: 12/12/23 0045     Blood Culture No growth at 2 days    Blood Culture - Blood, Arm, Left [[id number]]  (Normal) Collected: 12/10/23 0027    Lab Status: Preliminary result Specimen: Blood from Arm, Left Updated: 12/12/23 0045     Blood Culture No growth at 2 days    COVID-19, FLU A/B, RSV PCR 1 HR TAT - Swab, Nasopharynx [[id number]]  (Normal) Collected: 12/09/23 2050    Lab Status: Final result Specimen: Swab from Nasopharynx Updated: 12/09/23 2200     COVID19 Not Detected     Influenza A PCR Not Detected     Influenza B PCR Not Detected     RSV, PCR Not Detected    Narrative:      Fact sheet for  providers: https://www.fda.gov/media/203635/download    Fact sheet for patients: https://www.fda.gov/media/837236/download    Test performed by PCR.              Results for orders placed during the hospital encounter of 12/09/23    Adult Transthoracic Echo Complete w/ Color, Spectral and Contrast if necessary per protocol    Interpretation Summary    Left ventricular ejection fraction appears to be less than 20%.    The left ventricular cavity is moderately dilated.    The left atrial cavity is moderately dilated.    Estimated right ventricular systolic pressure from tricuspid regurgitation is moderately elevated (45-55 mmHg).    There is a small (<1cm) pericardial effusion.      Time spent on Discharge including face to face service: Greater than 30 minutes      Electronically signed by Salinas Russo MD, 12/12/23, 12:39 PM EST.

## 2023-12-15 LAB
BACTERIA SPEC AEROBE CULT: NORMAL
BACTERIA SPEC AEROBE CULT: NORMAL

## 2023-12-20 ENCOUNTER — READMISSION MANAGEMENT (OUTPATIENT)
Dept: CALL CENTER | Facility: HOSPITAL | Age: 33
End: 2023-12-20
Payer: MEDICAID

## 2023-12-20 NOTE — OUTREACH NOTE
CHF Week 2 Survey      Flowsheet Row Responses   [facility]   Does the patient have one of the following disease processes/diagnoses(primary or secondary)? CHF   Week 2 attempt successful? No   Unsuccessful attempts Attempt 1            Tesha Altamirano Registered Nurse

## 2023-12-21 ENCOUNTER — READMISSION MANAGEMENT (OUTPATIENT)
Dept: CALL CENTER | Facility: HOSPITAL | Age: 33
End: 2023-12-21
Payer: MEDICAID

## 2023-12-21 NOTE — OUTREACH NOTE
CHF Week 2 Survey      Flowsheet Row Responses   [facility] facility patient discharged from? [facility]   Does the patient have one of the following disease processes/diagnoses(primary or secondary)? CHF   Week 2 attempt successful? No   Unsuccessful attempts Attempt 2  [attempted home and friend who provided an updated cell number, attempted unsuccessfully (udpated chart)]            NERISSA RIZVI - Registered Nurse

## 2024-01-04 ENCOUNTER — READMISSION MANAGEMENT (OUTPATIENT)
Dept: CALL CENTER | Facility: HOSPITAL | Age: 34
End: 2024-01-04
Payer: MEDICAID

## 2024-01-04 NOTE — OUTREACH NOTE
CHF Week 3 Survey      Flowsheet Row Responses   [facility] [place] patient discharged from? [facility]   Does the patient have one of the following disease processes/diagnoses(primary or secondary)? CHF   Week 3 attempt successful? Yes   Call start time 1114   Call end time 1118   Meds reviewed with patient/caregiver? Yes   Is the patient having any side effects they believe may be caused by any medication additions or changes? No   Does the patient have all medications ordered at discharge? Yes   Is the patient taking all medications as directed (includes completed medication regime)? Yes   Comments regarding appointments Discussed importance of f/u with HF clinic on 1/9/24   Does the patient have a primary care provider?  No   PCP Nursing Intervention Provided number to obtain PCP   Does the patient have an appointment with their PCP within 7 days of discharge? No   What is preventing the patient from scheduling follow up appointments within 7 days of discharge? Unsure of when or with whom   Nursing Interventions Advised patient to make appointment   Has the patient kept scheduled appointments due by today? N/A   Has home health visited the patient within 72 hours of discharge? N/A   Psychosocial issues? No   Did the patient receive a copy of their discharge instructions? Yes   Nursing interventions Reviewed instructions with patient   What is the patient's perception of their health status since discharge? Improving  [Pt reports he is doing well---he denies any edema and reports he is urinating well with diuretics.  Pt reports he did not know to weigh QD--this RN discussed wt gain parameters of 2-3#daily or 5# weekly and need to notify MD.]   Nursing interventions Nurse provided patient education  [Reviewed s/s HF exac with pt--he is taking meds as ordered and plans on compliance with f/u with HF clinic next week.]   Is the patient able to teach back signs and symptoms of worsening condition? (i.e. weight gain,  shortness of air, etc.) Yes   Is the patient/caregiver able to teach back the hierarchy of who to call/visit for symptoms/problems? PCP, Specialist, Home health nurse, Urgent Care, ED, 911 Yes   Additional teach back comments Pt reports he had one day recently with some increase in SOA--discussed need for monitoring daily weights, has not weighed since discharge. Does have a scale at home.   CHF Week 3 call completed? Yes   Call end time 1118            NERISSA RIZVI - Registered Nurse

## 2024-01-09 ENCOUNTER — TELEPHONE (OUTPATIENT)
Dept: CARDIOLOGY | Facility: CLINIC | Age: 34
End: 2024-01-09

## 2024-01-09 NOTE — TELEPHONE ENCOUNTER
Left a voicemail for patient to return call to office to reschedule no show appointment. Sending patient letter as well.

## 2024-01-15 ENCOUNTER — TELEPHONE (OUTPATIENT)
Dept: CARDIOLOGY | Facility: CLINIC | Age: 34
End: 2024-01-15
Payer: MEDICAID

## 2024-01-17 ENCOUNTER — OFFICE VISIT (OUTPATIENT)
Dept: CARDIOLOGY | Facility: CLINIC | Age: 34
End: 2024-01-17
Payer: MEDICAID

## 2024-01-17 VITALS
BODY MASS INDEX: 28.2 KG/M2 | SYSTOLIC BLOOD PRESSURE: 116 MMHG | WEIGHT: 197 LBS | HEART RATE: 88 BPM | HEIGHT: 70 IN | DIASTOLIC BLOOD PRESSURE: 82 MMHG

## 2024-01-17 DIAGNOSIS — F17.210 CIGARETTE NICOTINE DEPENDENCE WITHOUT COMPLICATION: ICD-10-CM

## 2024-01-17 DIAGNOSIS — F19.90 IV DRUG USER: ICD-10-CM

## 2024-01-17 DIAGNOSIS — I50.42 CHRONIC HEART FAILURE WITH REDUCED EJECTION FRACTION AND DIASTOLIC DYSFUNCTION: Primary | ICD-10-CM

## 2024-01-17 LAB
ALBUMIN SERPL-MCNC: 4.4 G/DL (ref 3.5–5.2)
ALBUMIN/GLOB SERPL: 1.2 G/DL
ALP SERPL-CCNC: 140 U/L (ref 39–117)
ALT SERPL W P-5'-P-CCNC: 33 U/L (ref 1–41)
ANION GAP SERPL CALCULATED.3IONS-SCNC: 12 MMOL/L (ref 5–15)
AST SERPL-CCNC: 24 U/L (ref 1–40)
BASOPHILS # BLD AUTO: 0.07 10*3/MM3 (ref 0–0.2)
BASOPHILS NFR BLD AUTO: 0.9 % (ref 0–1.5)
BILIRUB SERPL-MCNC: 0.6 MG/DL (ref 0–1.2)
BUN SERPL-MCNC: 14 MG/DL (ref 6–20)
BUN/CREAT SERPL: 12.6 (ref 7–25)
CALCIUM SPEC-SCNC: 9.4 MG/DL (ref 8.6–10.5)
CHLORIDE SERPL-SCNC: 102 MMOL/L (ref 98–107)
CO2 SERPL-SCNC: 25 MMOL/L (ref 22–29)
CREAT SERPL-MCNC: 1.11 MG/DL (ref 0.76–1.27)
DEPRECATED RDW RBC AUTO: 41.6 FL (ref 37–54)
EGFRCR SERPLBLD CKD-EPI 2021: 88.8 ML/MIN/1.73
EOSINOPHIL # BLD AUTO: 0.16 10*3/MM3 (ref 0–0.4)
EOSINOPHIL NFR BLD AUTO: 2.2 % (ref 0.3–6.2)
ERYTHROCYTE [DISTWIDTH] IN BLOOD BY AUTOMATED COUNT: 12.6 % (ref 12.3–15.4)
GLOBULIN UR ELPH-MCNC: 3.6 GM/DL
GLUCOSE SERPL-MCNC: 94 MG/DL (ref 65–99)
HCT VFR BLD AUTO: 46.6 % (ref 37.5–51)
HGB BLD-MCNC: 15.7 G/DL (ref 13–17.7)
IMM GRANULOCYTES # BLD AUTO: 0.01 10*3/MM3 (ref 0–0.05)
IMM GRANULOCYTES NFR BLD AUTO: 0.1 % (ref 0–0.5)
IRON 24H UR-MRATE: 108 MCG/DL (ref 59–158)
IRON SATN MFR SERPL: 28 % (ref 20–50)
LYMPHOCYTES # BLD AUTO: 3.57 10*3/MM3 (ref 0.7–3.1)
LYMPHOCYTES NFR BLD AUTO: 48.4 % (ref 19.6–45.3)
MAGNESIUM SERPL-MCNC: 2.1 MG/DL (ref 1.6–2.6)
MCH RBC QN AUTO: 30.1 PG (ref 26.6–33)
MCHC RBC AUTO-ENTMCNC: 33.7 G/DL (ref 31.5–35.7)
MCV RBC AUTO: 89.4 FL (ref 79–97)
MONOCYTES # BLD AUTO: 0.62 10*3/MM3 (ref 0.1–0.9)
MONOCYTES NFR BLD AUTO: 8.4 % (ref 5–12)
NEUTROPHILS NFR BLD AUTO: 2.95 10*3/MM3 (ref 1.7–7)
NEUTROPHILS NFR BLD AUTO: 40 % (ref 42.7–76)
NRBC BLD AUTO-RTO: 0 /100 WBC (ref 0–0.2)
NT-PROBNP SERPL-MCNC: 172.7 PG/ML (ref 0–450)
PLATELET # BLD AUTO: 241 10*3/MM3 (ref 140–450)
PMV BLD AUTO: 9.3 FL (ref 6–12)
POTASSIUM SERPL-SCNC: 3.9 MMOL/L (ref 3.5–5.2)
PROT SERPL-MCNC: 8 G/DL (ref 6–8.5)
RBC # BLD AUTO: 5.21 10*6/MM3 (ref 4.14–5.8)
SODIUM SERPL-SCNC: 139 MMOL/L (ref 136–145)
T4 FREE SERPL-MCNC: 1.28 NG/DL (ref 0.93–1.7)
TIBC SERPL-MCNC: 383 MCG/DL (ref 298–536)
TRANSFERRIN SERPL-MCNC: 257 MG/DL (ref 200–360)
TSH SERPL DL<=0.05 MIU/L-ACNC: 2.63 UIU/ML (ref 0.27–4.2)
WBC NRBC COR # BLD AUTO: 7.38 10*3/MM3 (ref 3.4–10.8)

## 2024-01-17 PROCEDURE — 83880 ASSAY OF NATRIURETIC PEPTIDE: CPT | Performed by: INTERNAL MEDICINE

## 2024-01-17 PROCEDURE — 83735 ASSAY OF MAGNESIUM: CPT | Performed by: INTERNAL MEDICINE

## 2024-01-17 PROCEDURE — 84466 ASSAY OF TRANSFERRIN: CPT | Performed by: INTERNAL MEDICINE

## 2024-01-17 PROCEDURE — 82306 VITAMIN D 25 HYDROXY: CPT | Performed by: INTERNAL MEDICINE

## 2024-01-17 PROCEDURE — 80050 GENERAL HEALTH PANEL: CPT | Performed by: INTERNAL MEDICINE

## 2024-01-17 PROCEDURE — 84439 ASSAY OF FREE THYROXINE: CPT | Performed by: INTERNAL MEDICINE

## 2024-01-17 PROCEDURE — 83540 ASSAY OF IRON: CPT | Performed by: INTERNAL MEDICINE

## 2024-01-17 RX ORDER — CARVEDILOL 12.5 MG/1
12.5 TABLET ORAL 2 TIMES DAILY WITH MEALS
Qty: 180 TABLET | Refills: 3 | Status: SHIPPED | OUTPATIENT
Start: 2024-01-17

## 2024-01-17 RX ORDER — SACUBITRIL AND VALSARTAN 49; 51 MG/1; MG/1
1 TABLET, FILM COATED ORAL 2 TIMES DAILY
Qty: 180 TABLET | Refills: 3 | Status: SHIPPED | OUTPATIENT
Start: 2024-01-17

## 2024-01-17 RX ORDER — FUROSEMIDE 40 MG/1
20 TABLET ORAL EVERY OTHER DAY
Qty: 30 TABLET | Refills: 3 | Status: SHIPPED | OUTPATIENT
Start: 2024-01-17

## 2024-01-17 NOTE — ASSESSMENT & PLAN NOTE
Mr. Powell is a 33 years old young male who was admitted to the hospital with acute heart failure and has now developed chronic heart failure with very reduced ejection fraction.  I explained to him the long-term prognosis of the condition and emphasized to him that he needs to follow all instruction along with compliance with his medication and smoking cessation and avoidance of recreational drug abuse.  He understands and is willing to comply.  The following changes were made to his medications:    1.  Increase carvedilol to 1 tablet in the morning and 2 tablets at night for 1 week followed by 2 tablets twice a day.  When you finished this prescription will get the stronger medication which will be 12.5 mg 1 tablet twice a day.  2.  Increase Entresto 24-26 mg to 1 tablet in the morning and 2 tablets at night for 1 week followed by 2 tablets twice a day till you finish the current supply.  The new prescription will be 49-51 mg 1 tablet twice a day.  3.  Reduce the Lasix/furosemide to 20 mg every other day (half a tablet of 40 mg every other day).  4.  Maintain heart rate blood pressure and weight log and bring it to us.  5.  Do blood work 1 or 2 days before your next appointment.  Following an overnight fast.

## 2024-01-17 NOTE — ASSESSMENT & PLAN NOTE
I have strongly urged him to avoid recreational drug use.  He states that he has been compliant and does not plan on going back to his old habits.

## 2024-01-17 NOTE — PATIENT INSTRUCTIONS
1.  Increase carvedilol to 1 tablet in the morning and 2 tablets at night for 1 week followed by 2 tablets twice a day.  When you finished this prescription will get the stronger medication which will be 12.5 mg 1 tablet twice a day.  2.  Increase Entresto 24-26 mg to 1 tablet in the morning and 2 tablets at night for 1 week followed by 2 tablets twice a day till you finish the current supply.  The new prescription will be 49-51 mg 1 tablet twice a day.  3.  Reduce the Lasix/furosemide to 20 mg every other day (half a tablet of 40 mg every other day).  4.  Maintain heart rate blood pressure and weight log and bring it to us.  5.  Do blood work 1 or 2 days before your next appointment.  Following an overnight fast.

## 2024-01-17 NOTE — ASSESSMENT & PLAN NOTE
Patient has been smoking cigarettes for a long.  He is smoking half to 1 pack/day.  Smoking cessation counseling was performed for 5-7 minutes.  Clearly indicated to him the need to quit smoking.  Offered him nicotine patches but he declined and said he is going to do it on his own in the next week or so.  Will monitor.

## 2024-01-17 NOTE — PROGRESS NOTES
New Patient Office Visit    Chief Complaint  acute CHF    Subjective            Osmel Powell presents to [facility] CARDIOLOGY  History of Present Illness  Mr. Osmel Powell is a young 33-year-old male with a recent admission for acute congestive heart failure who is doing a lot better since discharge.  He had a history of IV and oral drug abuse and was admitted to the hospital with acute heart failure with reduced ejection fraction.  He was feeling extremely short of breath diaphoretic with paroxysmal nocturnal dyspnea and orthopnea and symptoms at rest.  This occurred over a span of 1 month.  His ejection fraction was extremely low on admission.  Since discharge after getting diuretics and started on heart failure therapy is doing a lot better.  He feels weak and tired and fatigued but denies any significant shortness of breath on moderate exertion.  He has not overexerted himself lately.  He has given him IV and oral recreational drug abuse but still smokes.      History reviewed. No pertinent past medical history.    No Known Allergies     Past Surgical History:   Procedure Laterality Date    ADENOIDECTOMY      TONSILLECTOMY          Social History     Tobacco Use    Smoking status: Every Day     Packs/day: 0.50     Years: 15.00     Additional pack years: 0.00     Total pack years: 7.50     Types: Cigarettes    Smokeless tobacco: Never   Vaping Use    Vaping Use: Never used   Substance Use Topics    Alcohol use: Never    Drug use: Yes     Types: Marijuana     Comment: hx IV meth       History reviewed. No pertinent family history.     Prior to Admission medications    Medication Sig Start Date End Date Taking? Authorizing Provider   carvedilol (COREG) 6.25 MG tablet Take 1 tablet by mouth 2 (Two) Times a Day With Meals. 12/12/23  Yes JOANNE Parmar MD   furosemide (LASIX) 40 MG tablet Take 1 tablet by mouth Daily. 12/12/23  Yes JOANNE Parmar MD   sacubitril-valsartan (ENTRESTO) 24-26 MG  "tablet Take 1 tablet by mouth Every 12 (Twelve) Hours. 12/12/23  Yes JOANNE Parmar MD   spironolactone (ALDACTONE) 25 MG tablet Take 1 tablet by mouth Daily. 12/13/23  Yes JOANNE Parmar MD        Review of Systems   Constitutional:  Positive for fatigue. Negative for unexpected weight gain and unexpected weight loss.   HENT:  Negative for hearing loss, sinus pressure and swollen glands.    Eyes:  Negative for blurred vision and double vision.   Respiratory:  Negative for cough, shortness of breath and wheezing.    Cardiovascular:  Negative for chest pain, palpitations and leg swelling.   Gastrointestinal:  Negative for nausea and vomiting.   Endocrine: Positive for polydipsia and polyuria. Negative for cold intolerance and heat intolerance.   Musculoskeletal:  Negative for arthralgias and back pain.   Neurological:  Negative for dizziness, light-headedness and headache.   Hematological:  Does not bruise/bleed easily.        Symptom Course: Improved    Weight Trend: Stable     Objective     /82   Pulse 88   Ht 177.8 cm (70\")   Wt 89.4 kg (197 lb)   BMI 28.27 kg/m²       Physical Exam  Constitutional:       General: He is awake.      Appearance: Normal appearance.   Neck:      Thyroid: No thyromegaly.      Vascular: No carotid bruit or JVD.   Cardiovascular:      Rate and Rhythm: Normal rate and regular rhythm.      Chest Wall: PMI is not displaced.      Pulses: Normal pulses.      Heart sounds: Normal heart sounds, S1 normal and S2 normal. No murmur heard.     No friction rub. No gallop. No S3 or S4 sounds.   Pulmonary:      Effort: Pulmonary effort is normal.      Breath sounds: Normal breath sounds and air entry. No wheezing, rhonchi or rales.   Abdominal:      General: Bowel sounds are normal.      Palpations: Abdomen is soft. There is no mass.      Tenderness: There is no abdominal tenderness.   Musculoskeletal:      Cervical back: Neck supple.      Right lower leg: No edema.      Left lower leg: No " "edema.   Neurological:      Mental Status: He is alert and oriented to person, place, and time.   Psychiatric:         Mood and Affect: Mood normal.         Behavior: Behavior is cooperative.           Result Review :                      Lab Results   Component Value Date    PROBNP 1,967.0 (H) 12/09/2023     CMP          12/9/2023    21:16 12/11/2023    04:34 12/12/2023    05:44   CMP   Glucose 114  86  102    BUN 19  16  16    Creatinine 1.35  1.17  1.15    EGFR 70.2  83.4  85.1    Sodium 140  139  136    Potassium 4.2  3.8  4.1    Chloride 105  101  100    Calcium 9.0  9.1  9.1    Total Protein 6.2      Albumin 3.8      Globulin 2.4      Total Bilirubin 1.0      Alkaline Phosphatase 101      AST (SGOT) 23      ALT (SGPT) 22      Albumin/Globulin Ratio 1.6      BUN/Creatinine Ratio 14.1  13.7  13.9    Anion Gap 11.2  11.4  8.3      CBC w/diff          12/9/2023    21:16 12/11/2023    04:34 12/12/2023    05:44   CBC w/Diff   WBC 7.58  8.18  10.55    RBC 4.11  4.63  5.04    Hemoglobin 12.7  14.6  15.5    Hematocrit 40.1  43.9  47.3    MCV 97.6  94.8  93.8    MCH 30.9  31.5  30.8    MCHC 31.7  33.3  32.8    RDW 12.9  12.8  12.7    Platelets 232  265  271    Neutrophil Rel % 61.1  49.6  64.1    Immature Granulocyte Rel % 0.1  0.1  0.3    Lymphocyte Rel % 31.5  40.0  26.8    Monocyte Rel % 5.7  7.3  6.1    Eosinophil Rel % 0.9  2.0  2.0    Basophil Rel % 0.7  1.0  0.7          Lab Results   Component Value Date    TSH 1.180 12/09/2023      Lab Results   Component Value Date    FREET4 1.42 12/09/2023      No results found for: \"DDIMERQUANT\"  Magnesium   Date Value Ref Range Status   12/12/2023 2.1 1.6 - 2.6 mg/dL Final      No results found for: \"DIGOXIN\"          Results for orders placed during the hospital encounter of 12/09/23    Adult Transthoracic Echo Complete w/ Color, Spectral and Contrast if necessary per protocol    Interpretation Summary    Left ventricular ejection fraction appears to be less than 20%.    " The left ventricular cavity is moderately dilated.    The left atrial cavity is moderately dilated.    Estimated right ventricular systolic pressure from tricuspid regurgitation is moderately elevated (45-55 mmHg).    There is a small (<1cm) pericardial effusion.        Assessment and Plan        Diagnoses and all orders for this visit:    1. Chronic heart failure with reduced ejection fraction and diastolic dysfunction (Primary)  Assessment & Plan:  Mr. Powell is a 33 years old young male who was admitted to the hospital with acute heart failure and has now developed chronic heart failure with very reduced ejection fraction.  I explained to him the long-term prognosis of the condition and emphasized to him that he needs to follow all instruction along with compliance with his medication and smoking cessation and avoidance of recreational drug abuse.  He understands and is willing to comply.  The following changes were made to his medications:    1.  Increase carvedilol to 1 tablet in the morning and 2 tablets at night for 1 week followed by 2 tablets twice a day.  When you finished this prescription will get the stronger medication which will be 12.5 mg 1 tablet twice a day.  2.  Increase Entresto 24-26 mg to 1 tablet in the morning and 2 tablets at night for 1 week followed by 2 tablets twice a day till you finish the current supply.  The new prescription will be 49-51 mg 1 tablet twice a day.  3.  Reduce the Lasix/furosemide to 20 mg every other day (half a tablet of 40 mg every other day).  4.  Maintain heart rate blood pressure and weight log and bring it to us.  5.  Do blood work 1 or 2 days before your next appointment.  Following an overnight fast.    Orders:  -     CBC & Differential  -     Comprehensive Metabolic Panel  -     proBNP  -     T4, Free  -     TSH  -     Iron Profile; Future  -     25-HydroxyVitamin D LCMS D2+D3; Future  -     Comprehensive Metabolic Panel; Future  -     Magnesium; Future  -      Magnesium  -     proBNP; Future  -     Lipid Panel; Future  -     Iron Profile  -     25-HydroxyVitamin D LCMS D2+D3    2. IV drug user  Assessment & Plan:  I have strongly urged him to avoid recreational drug use.  He states that he has been compliant and does not plan on going back to his old habits.      3. Cigarette nicotine dependence without complication  Assessment & Plan:  Patient has been smoking cigarettes for a long.  He is smoking half to 1 pack/day.  Smoking cessation counseling was performed for 5-7 minutes.  Clearly indicated to him the need to quit smoking.  Offered him nicotine patches but he declined and said he is going to do it on his own in the next week or so.  Will monitor.      Other orders  -     carvedilol (COREG) 12.5 MG tablet; Take 1 tablet by mouth 2 (Two) Times a Day With Meals.  Dispense: 180 tablet; Refill: 3  -     furosemide (LASIX) 40 MG tablet; Take 0.5 tablets by mouth Every Other Day.  Dispense: 30 tablet; Refill: 3  -     sacubitril-valsartan (Entresto) 49-51 MG tablet; Take 1 tablet by mouth 2 (Two) Times a Day.  Dispense: 180 tablet; Refill: 3      Education  Osmel Powell 1990 male  who presents for follow-up of congestive heart failure. Counseling was provided to Osmel Powell for the following topics.   Daily log and monitoring of blood pressure, Osmel Powell was instructed to keep a blood pressure log to monitor for trends of normal and possible abnormal blood pressure readings. Osmel Powell was instructed to bring the blood pressure log to next appointment. Osmel Powell was instructed to keep a daily weight log and to monitor for weight gain. Instructed to notify the physician office if patient experiences a weight gain of 2-3 pounds overnight, or if experiences a weight gain of 5 pounds in a week. Instruction provided to the patient to elevate bilateral lower extremities to help alleviate edema. Instruction provided to patient to decrease salt and fluid  intake. Instruction provided on new medications and possible side effects, and when to call the physician office.       Follow Up     Return for Follow-up in 3 weeks.    Patient was given instructions and counseling regarding his condition or for health maintenance advice. Please see specific information pulled into the AVS if appropriate.     Total time spent, 40 minutes.This time includes time spent by me for the following activities:  Reviewing past records including hospitalizations, performing a cardiac focused examination and/or evaluation, educating and counselling the patient , independently interpreting results and diagnostic tests and communicating that information to patient , documenting information in the medical record, etc. E/M time spent excludes any time spent on other reported services.    Electronically signed by Shauna Groves MD, 01/17/24, 3:35 PM EST.

## 2024-01-19 ENCOUNTER — PATIENT MESSAGE (OUTPATIENT)
Dept: CARDIOLOGY | Facility: CLINIC | Age: 34
End: 2024-01-19
Payer: MEDICAID

## 2024-01-24 LAB
25(OH)D2 SERPL-MCNC: <1 NG/ML
25(OH)D3 SERPL-MCNC: 21 NG/ML
25(OH)D3+25(OH)D2 SERPL-MCNC: 21 NG/ML

## 2024-01-29 ENCOUNTER — PATIENT MESSAGE (OUTPATIENT)
Dept: CARDIOLOGY | Facility: CLINIC | Age: 34
End: 2024-01-29

## 2024-02-05 ENCOUNTER — TELEPHONE (OUTPATIENT)
Dept: CARDIOLOGY | Facility: CLINIC | Age: 34
End: 2024-02-05
Payer: MEDICAID

## 2024-02-05 NOTE — TELEPHONE ENCOUNTER
Left voice mail to remind pt of appointment on Wednesday 2/7 @ 10:00, to get fasting lab work done and to bring medications and bp log to appointment.

## 2024-05-13 ENCOUNTER — TELEPHONE (OUTPATIENT)
Dept: CARDIOLOGY | Facility: CLINIC | Age: 34
End: 2024-05-13
Payer: MEDICAID

## 2024-05-13 NOTE — TELEPHONE ENCOUNTER
Left a voicemail for patient to return call to office to reschedule missed appointments and to get over due labs complete.

## 2024-09-21 ENCOUNTER — HOSPITAL ENCOUNTER (EMERGENCY)
Facility: HOSPITAL | Age: 34
Discharge: HOME | End: 2024-09-21
Attending: EMERGENCY MEDICINE
Payer: MEDICAID

## 2024-09-21 ENCOUNTER — APPOINTMENT (OUTPATIENT)
Dept: GENERAL RADIOLOGY | Facility: HOSPITAL | Age: 34
End: 2024-09-21
Payer: MEDICAID

## 2024-09-21 VITALS
HEART RATE: 112 BPM | TEMPERATURE: 98.2 F | HEIGHT: 70 IN | OXYGEN SATURATION: 97 % | RESPIRATION RATE: 20 BRPM | DIASTOLIC BLOOD PRESSURE: 113 MMHG | SYSTOLIC BLOOD PRESSURE: 140 MMHG | WEIGHT: 228.18 LBS | BODY MASS INDEX: 32.67 KG/M2

## 2024-09-21 DIAGNOSIS — I50.9 ACUTE ON CHRONIC CONGESTIVE HEART FAILURE, UNSPECIFIED HEART FAILURE TYPE: Primary | ICD-10-CM

## 2024-09-21 LAB
ALBUMIN SERPL-MCNC: 3.7 G/DL (ref 3.5–5.2)
ALBUMIN/GLOB SERPL: 1.4 G/DL
ALP SERPL-CCNC: 111 U/L (ref 39–117)
ALT SERPL W P-5'-P-CCNC: 21 U/L (ref 1–41)
ANION GAP SERPL CALCULATED.3IONS-SCNC: 9 MMOL/L (ref 5–15)
AST SERPL-CCNC: 19 U/L (ref 1–40)
BASOPHILS # BLD AUTO: 0.03 10*3/MM3 (ref 0–0.2)
BASOPHILS NFR BLD AUTO: 0.5 % (ref 0–1.5)
BILIRUB SERPL-MCNC: 1.2 MG/DL (ref 0–1.2)
BUN SERPL-MCNC: 8 MG/DL (ref 6–20)
BUN/CREAT SERPL: 8.2 (ref 7–25)
CALCIUM SPEC-SCNC: 8.6 MG/DL (ref 8.6–10.5)
CHLORIDE SERPL-SCNC: 108 MMOL/L (ref 98–107)
CO2 SERPL-SCNC: 25 MMOL/L (ref 22–29)
CREAT SERPL-MCNC: 0.97 MG/DL (ref 0.76–1.27)
DEPRECATED RDW RBC AUTO: 46.6 FL (ref 37–54)
EGFRCR SERPLBLD CKD-EPI 2021: 103.8 ML/MIN/1.73
EOSINOPHIL # BLD AUTO: 0.08 10*3/MM3 (ref 0–0.4)
EOSINOPHIL NFR BLD AUTO: 1.3 % (ref 0.3–6.2)
ERYTHROCYTE [DISTWIDTH] IN BLOOD BY AUTOMATED COUNT: 13.6 % (ref 12.3–15.4)
FLUAV SUBTYP SPEC NAA+PROBE: NOT DETECTED
FLUBV RNA ISLT QL NAA+PROBE: NOT DETECTED
GLOBULIN UR ELPH-MCNC: 2.7 GM/DL
GLUCOSE SERPL-MCNC: 150 MG/DL (ref 65–99)
HCT VFR BLD AUTO: 35.9 % (ref 37.5–51)
HGB BLD-MCNC: 12.3 G/DL (ref 13–17.7)
HOLD SPECIMEN: NORMAL
HOLD SPECIMEN: NORMAL
IMM GRANULOCYTES # BLD AUTO: 0.02 10*3/MM3 (ref 0–0.05)
IMM GRANULOCYTES NFR BLD AUTO: 0.3 % (ref 0–0.5)
LYMPHOCYTES # BLD AUTO: 1.33 10*3/MM3 (ref 0.7–3.1)
LYMPHOCYTES NFR BLD AUTO: 21.4 % (ref 19.6–45.3)
MCH RBC QN AUTO: 32.6 PG (ref 26.6–33)
MCHC RBC AUTO-ENTMCNC: 34.3 G/DL (ref 31.5–35.7)
MCV RBC AUTO: 95.2 FL (ref 79–97)
MONOCYTES # BLD AUTO: 0.35 10*3/MM3 (ref 0.1–0.9)
MONOCYTES NFR BLD AUTO: 5.6 % (ref 5–12)
NEUTROPHILS NFR BLD AUTO: 4.41 10*3/MM3 (ref 1.7–7)
NEUTROPHILS NFR BLD AUTO: 70.9 % (ref 42.7–76)
NRBC BLD AUTO-RTO: 0 /100 WBC (ref 0–0.2)
NT-PROBNP SERPL-MCNC: 1962 PG/ML (ref 0–450)
PLATELET # BLD AUTO: 205 10*3/MM3 (ref 140–450)
PMV BLD AUTO: 9.2 FL (ref 6–12)
POTASSIUM SERPL-SCNC: 3.7 MMOL/L (ref 3.5–5.2)
PROT SERPL-MCNC: 6.4 G/DL (ref 6–8.5)
QT INTERVAL: 331 MS
QTC INTERVAL: 464 MS
RBC # BLD AUTO: 3.77 10*6/MM3 (ref 4.14–5.8)
RSV RNA NPH QL NAA+NON-PROBE: NOT DETECTED
SARS-COV-2 RNA RESP QL NAA+PROBE: NOT DETECTED
SODIUM SERPL-SCNC: 142 MMOL/L (ref 136–145)
TROPONIN T SERPL HS-MCNC: 17 NG/L
WBC NRBC COR # BLD AUTO: 6.22 10*3/MM3 (ref 3.4–10.8)
WHOLE BLOOD HOLD COAG: NORMAL
WHOLE BLOOD HOLD SPECIMEN: NORMAL

## 2024-09-21 PROCEDURE — 99284 EMERGENCY DEPT VISIT MOD MDM: CPT

## 2024-09-21 PROCEDURE — 80053 COMPREHEN METABOLIC PANEL: CPT | Performed by: EMERGENCY MEDICINE

## 2024-09-21 PROCEDURE — 87637 SARSCOV2&INF A&B&RSV AMP PRB: CPT | Performed by: EMERGENCY MEDICINE

## 2024-09-21 PROCEDURE — 93005 ELECTROCARDIOGRAM TRACING: CPT

## 2024-09-21 PROCEDURE — 71045 X-RAY EXAM CHEST 1 VIEW: CPT

## 2024-09-21 PROCEDURE — 83880 ASSAY OF NATRIURETIC PEPTIDE: CPT | Performed by: EMERGENCY MEDICINE

## 2024-09-21 PROCEDURE — 93005 ELECTROCARDIOGRAM TRACING: CPT | Performed by: EMERGENCY MEDICINE

## 2024-09-21 PROCEDURE — 84484 ASSAY OF TROPONIN QUANT: CPT | Performed by: EMERGENCY MEDICINE

## 2024-09-21 PROCEDURE — 85025 COMPLETE CBC W/AUTO DIFF WBC: CPT | Performed by: EMERGENCY MEDICINE

## 2024-09-21 PROCEDURE — 25010000002 FUROSEMIDE PER 20 MG: Performed by: EMERGENCY MEDICINE

## 2024-09-21 PROCEDURE — 96374 THER/PROPH/DIAG INJ IV PUSH: CPT

## 2024-09-21 RX ORDER — FUROSEMIDE 10 MG/ML
40 INJECTION INTRAMUSCULAR; INTRAVENOUS ONCE
Status: COMPLETED | OUTPATIENT
Start: 2024-09-21 | End: 2024-09-21

## 2024-09-21 RX ORDER — SPIRONOLACTONE 25 MG/1
25 TABLET ORAL DAILY
Qty: 30 TABLET | Refills: 0 | Status: SHIPPED | OUTPATIENT
Start: 2024-09-21

## 2024-09-21 RX ORDER — SODIUM CHLORIDE 0.9 % (FLUSH) 0.9 %
10 SYRINGE (ML) INJECTION AS NEEDED
Status: DISCONTINUED | OUTPATIENT
Start: 2024-09-21 | End: 2024-09-21 | Stop reason: HOSPADM

## 2024-09-21 RX ORDER — FUROSEMIDE 40 MG
40 TABLET ORAL DAILY
Qty: 20 TABLET | Refills: 0 | Status: SHIPPED | OUTPATIENT
Start: 2024-09-21

## 2024-09-21 RX ORDER — CARVEDILOL 12.5 MG/1
12.5 TABLET ORAL DAILY
Qty: 30 TABLET | Refills: 0 | Status: SHIPPED | OUTPATIENT
Start: 2024-09-21

## 2024-09-21 RX ADMIN — FUROSEMIDE 40 MG: 10 INJECTION, SOLUTION INTRAMUSCULAR; INTRAVENOUS at 10:07

## 2024-09-21 NOTE — DISCHARGE INSTRUCTIONS
Expect a phone call on Monday with a follow-up appointment to see Dr. Spears, cardiology.  Restart all of your medications today

## 2024-09-21 NOTE — ED PROVIDER NOTES
Time: 8:02 AM EDT  Date of encounter:  9/21/2024  Independent Historian/Clinical History and Information was obtained by:   Patient    History is limited by: N/A    Chief Complaint: Short of breath      History of Present Illness:  Patient is a 34 y.o. year old male who presents to the emergency department for evaluation of shortness of breath.  Patient has history of congestive heart failure.  He was seeing Dr. Groves, cardiology and was last seen in January of this year.  A 2D echocardiogram of the heart in December of last year showed an EF less than 20%.  Patient was placed on Coreg, spironolactone, Lasix and Entresto however he has not had his medications for over 2 months.  Patient has not had any follow-up with cardiology since his January appointment.      Patient Care Team  Primary Care Provider: Provider, No Known    Past Medical History:     No Known Allergies  History reviewed. No pertinent past medical history.  Past Surgical History:   Procedure Laterality Date    ADENOIDECTOMY      TONSILLECTOMY       History reviewed. No pertinent family history.    Home Medications:  Prior to Admission medications    Medication Sig Start Date End Date Taking? Authorizing Provider   carvedilol (COREG) 12.5 MG tablet Take 1 tablet by mouth 2 (Two) Times a Day With Meals. 1/17/24   Shauna Groves MD   furosemide (LASIX) 40 MG tablet Take 0.5 tablets by mouth Every Other Day. 1/17/24   Shauna Groves MD   sacubitril-valsartan (Entresto) 49-51 MG tablet Take 1 tablet by mouth 2 (Two) Times a Day. 1/17/24   Shauna Groves MD   spironolactone (ALDACTONE) 25 MG tablet Take 1 tablet by mouth Daily. 12/13/23   JOANNE Parmar MD        Social History:   Social History     Tobacco Use    Smoking status: Every Day     Current packs/day: 0.50     Average packs/day: 0.5 packs/day for 15.0 years (7.5 ttl pk-yrs)     Types: Cigarettes    Smokeless tobacco: Never   Vaping Use    Vaping status: Never Used   Substance Use Topics     "Alcohol use: Never    Drug use: Yes     Types: Marijuana     Comment: hx IV meth         Review of Systems:  Review of Systems     Physical Exam:  BP (!) 140/113   Pulse 112   Temp 98.3 °F (36.8 °C)   Resp 20   Ht 177.8 cm (70\")   Wt 104 kg (228 lb 2.8 oz)   SpO2 97%   BMI 32.74 kg/m²     Physical Exam   ***          Procedures:  Procedures      Medical Decision Making:      Comorbidities that affect care:    {Comorbidities that affect care:89816}    External Notes reviewed:    {External Note review (Optional):52449}      The following orders were placed and all results were independently analyzed by me:  Orders Placed This Encounter   Procedures    COVID-19, FLU A/B, RSV PCR 1 HR TAT - Swab, Nasopharynx    XR Chest 1 View    [place] Draw    Comprehensive Metabolic Panel    BNP    Single High Sensitivity Troponin T    CBC Auto Differential    Ambulatory Referral to Cardiology    NPO Diet NPO Type: Strict NPO    Undress & Gown    Continuous Pulse Oximetry    Vital Signs    Inpatient Cardiology Consult    Oxygen Therapy- Nasal Cannula; Titrate 1-6 LPM Per SpO2; 90 - 95%    ECG 12 Lead ED Triage Standing Order; SOA    Insert Peripheral IV    CBC & Differential    Green Top (Gel)    Lavender Top    Gold Top - SST    Light Blue Top       Medications Given in the Emergency Department:  Medications   sodium chloride 0.9 % flush 10 mL (has no administration in time range)   furosemide (LASIX) injection 40 mg (40 mg Intravenous Given 9/21/24 1007)        ED Course:    ED Course as of 09/21/24 1234   Sat Sep 21, 2024   0859 proBNP(!): 1,962.0 [NL]      ED Course User Index  [NL] Faheem Moya DO       Labs:    Lab Results (last 24 hours)       Procedure Component Value Units Date/Time    CBC & Differential [[id number]]  (Abnormal) Collected: 09/21/24 0809    Specimen: Blood from Arm, Left Updated: 09/21/24 0821    Narrative:      The following orders were created for panel order CBC & Differential.  Procedure            "                    Abnormality         Status                     ---------                               -----------         ------                     CBC Auto Differential[[id number]]        Abnormal            Final result                 Please view results for these tests on the individual orders.    Comprehensive Metabolic Panel [[id number]]  (Abnormal) Collected: 09/21/24 0809    Specimen: Blood from Arm, Left Updated: 09/21/24 0843     Glucose 150 mg/dL      BUN 8 mg/dL      Creatinine 0.97 mg/dL      Sodium 142 mmol/L      Potassium 3.7 mmol/L      Chloride 108 mmol/L      CO2 25.0 mmol/L      Calcium 8.6 mg/dL      Total Protein 6.4 g/dL      Albumin 3.7 g/dL      ALT (SGPT) 21 U/L      AST (SGOT) 19 U/L      Alkaline Phosphatase 111 U/L      Total Bilirubin 1.2 mg/dL      Globulin 2.7 gm/dL      A/G Ratio 1.4 g/dL      BUN/Creatinine Ratio 8.2     Anion Gap 9.0 mmol/L      eGFR 103.8 mL/min/1.73     Narrative:      GFR Normal >60  Chronic Kidney Disease <60  Kidney Failure <15      BNP [[id number]]  (Abnormal) Collected: 09/21/24 0809    Specimen: Blood from Arm, Left Updated: 09/21/24 0838     proBNP 1,962.0 pg/mL     Narrative:      This assay is used as an aid in the diagnosis of individuals suspected of having heart failure. It can be used as an aid in the diagnosis of acute decompensated heart failure (ADHF) in patients presenting with signs and symptoms of ADHF to the emergency department (ED). In addition, NT-proBNP of <300 pg/mL indicates ADHF is not likely.    Age Range Result Interpretation  NT-proBNP Concentration (pg/mL:      <50             Positive            >450                   Gray                 300-450                    Negative             <300    50-75           Positive            >900                  Gray                300-900                  Negative            <300      >75             Positive            >1800                  Gray                300-1800                   Negative            <300    Single High Sensitivity Troponin T [[id number]]  (Normal) Collected: 09/21/24 0809    Specimen: Blood from Arm, Left Updated: 09/21/24 0843     HS Troponin T 17 ng/L     Narrative:      High Sensitive Troponin T Reference Range:  <14.0 ng/L- Negative Female for AMI  <22.0 ng/L- Negative Male for AMI  >=14 - Abnormal Female indicating possible myocardial injury.  >=22 - Abnormal Male indicating possible myocardial injury.   Clinicians would have to utilize clinical acumen, EKG, Troponin, and serial changes to determine if it is an Acute Myocardial Infarction or myocardial injury due to an underlying chronic condition.         CBC Auto Differential [[id number]]  (Abnormal) Collected: 09/21/24 0809    Specimen: Blood from Arm, Left Updated: 09/21/24 0821     WBC 6.22 10*3/mm3      RBC 3.77 10*6/mm3      Hemoglobin 12.3 g/dL      Hematocrit 35.9 %      MCV 95.2 fL      MCH 32.6 pg      MCHC 34.3 g/dL      RDW 13.6 %      RDW-SD 46.6 fl      MPV 9.2 fL      Platelets 205 10*3/mm3      Neutrophil % 70.9 %      Lymphocyte % 21.4 %      Monocyte % 5.6 %      Eosinophil % 1.3 %      Basophil % 0.5 %      Immature Grans % 0.3 %      Neutrophils, Absolute 4.41 10*3/mm3      Lymphocytes, Absolute 1.33 10*3/mm3      Monocytes, Absolute 0.35 10*3/mm3      Eosinophils, Absolute 0.08 10*3/mm3      Basophils, Absolute 0.03 10*3/mm3      Immature Grans, Absolute 0.02 10*3/mm3      nRBC 0.0 /100 WBC     COVID-19, FLU A/B, RSV PCR 1 HR TAT - Swab, Nasopharynx [[id number]]  (Normal) Collected: 09/21/24 0825    Specimen: Swab from Nasopharynx Updated: 09/21/24 0914     COVID19 Not Detected     Influenza A PCR Not Detected     Influenza B PCR Not Detected     RSV, PCR Not Detected    Narrative:      Fact sheet for providers: https://www.fda.gov/media/601787/download    Fact sheet for patients: https://www.fda.gov/media/839223/download    Test performed by PCR.             Imaging:    XR Chest 1 View    Result  Date: 9/21/2024  XR CHEST 1 VW Date of Exam: 9/21/2024 7:49 AM EDT Indication: SOA Triage Protocol Comparison: Chest radiograph dated 12/9/2023 Findings: There is cardiomegaly. There are increased interstitial opacities throughout both lungs which could represent an interstitial pulmonary edema pattern or atypical infection. There is no pleural effusion or pneumothorax. There are degenerative changes of the thoracic spine.     Impression: 1. Cardiomegaly with bilateral interstitial opacities which could represent interstitial pulmonary edema pattern or atypical infection. Electronically Signed: Darío Gilelor  9/21/2024 8:09 AM EDT  Workstation ID: [id number]       Differential Diagnosis and Discussion:    {Differentials:34333}    {Independent Review of (Optional):61170}    MDM  Patient had excellent urine output after receiving Lasix 40 mg IV, the patient states his breathing feels better.  Patient is stable and per Dr. Spears can follow-up in the office this next week.  Prescriptions for all his CHF medications were refilled.          Patient Care Considerations:    ANTIBIOTICS: I considered prescribing antibiotics as an outpatient however no bacterial focus of infection was found.      Consultants/Shared Management Plan:  Patient discussed with Dr. Spears, cardiology, who recommends that if the patient has good output after receiving Lasix he can be discharged.      Social Determinants of Health:    Patient is independent, reliable, and has access to care.       Disposition and Care Coordination:    {Admission consideration:61635}    I have explained the patient´s condition, diagnoses and treatment plan based on the information available to me at this time. I have answered questions and addressed any concerns. The patient has a good  understanding of the patient´s diagnosis, condition, and treatment plan as can be expected at this point. The vital signs have been stable. The patient´s condition is stable and  appropriate for discharge from the emergency department.      The patient will pursue further outpatient evaluation with the primary care physician or other designated or consulting physician as outlined in the discharge instructions. They are agreeable to this plan of care and follow-up instructions have been explained in detail. The patient has received these instructions in written format and has expressed an understanding of the discharge instructions. The patient is aware that any significant change in condition or worsening of symptoms should prompt an immediate return to this or the closest emergency department or call to 911.  I have explained discharge medications and the need for follow up with the patient/caretakers. This was also printed in the discharge instructions. Patient was discharged with the following medications and follow up:      Medication List        Changed      * carvedilol 12.5 MG tablet  Commonly known as: COREG  Take 1 tablet by mouth 2 (Two) Times a Day With Meals.  What changed: Another medication with the same name was added. Make sure you understand how and when to take each.     * carvedilol 12.5 MG tablet  Commonly known as: Coreg  Take 1 tablet by mouth Daily.  What changed: You were already taking a medication with the same name, and this prescription was added. Make sure you understand how and when to take each.     * Entresto 49-51 MG tablet  Generic drug: sacubitril-valsartan  Take 1 tablet by mouth 2 (Two) Times a Day.  What changed: Another medication with the same name was added. Make sure you understand how and when to take each.     * sacubitril-valsartan 49-51 MG tablet  Commonly known as: ENTRESTO  Take 1 tablet by mouth 2 (Two) Times a Day.  What changed: You were already taking a medication with the same name, and this prescription was added. Make sure you understand how and when to take each.     * furosemide 40 MG tablet  Commonly known as: LASIX  Take 0.5 tablets  by mouth Every Other Day.  What changed: Another medication with the same name was added. Make sure you understand how and when to take each.     * furosemide 40 MG tablet  Commonly known as: LASIX  Take 1 tablet by mouth Daily.  What changed: You were already taking a medication with the same name, and this prescription was added. Make sure you understand how and when to take each.     * spironolactone 25 MG tablet  Commonly known as: ALDACTONE  Take 1 tablet by mouth Daily.  What changed: Another medication with the same name was added. Make sure you understand how and when to take each.     * spironolactone 25 MG tablet  Commonly known as: ALDACTONE  Take 1 tablet by mouth Daily.  What changed: You were already taking a medication with the same name, and this prescription was added. Make sure you understand how and when to take each.           * This list has 8 medication(s) that are the same as other medications prescribed for you. Read the directions carefully, and ask your doctor or other care provider to review them with you.                   Where to Get Your Medications        These medications were sent to [id number] - [place] - [address] AT [facility] [address] /[facility] - [phone] [facility] [phone]   [address] [place] [place] [address]      Phone: [phone]   carvedilol 12.5 MG tablet  furosemide 40 MG tablet  sacubitril-valsartan 49-51 MG tablet  spironolactone 25 MG tablet      Maximino Spears MD  [address] [place] [address] [phone]            Final diagnoses:   Acute on chronic congestive heart failure, unspecified heart failure type        ED Disposition       ED Disposition   Discharge    Condition   Stable    Comment   --               This medical record created using voice recognition software.           each.     * spironolactone 25 MG tablet  Commonly known as: ALDACTONE  Take 1 tablet by mouth Daily.  What changed: You were already taking a medication with the same name, and this prescription was added. Make sure you understand how and when to take each.           * This list has 8 medication(s) that are the same as other medications prescribed for you. Read the directions carefully, and ask your doctor or other care provider to review them with you.                   Where to Get Your Medications        These medications were sent to [id number] - [place], [place] - [address] AT [facility] [address] /[facility] & [place] - [phone]  - [phone]   [address] [place] [place] [address]      Phone: [phone]   carvedilol 12.5 MG tablet  furosemide 40 MG tablet  sacubitril-valsartan 49-51 MG tablet  spironolactone 25 MG tablet      Maximino Spears MD  [address] [place] [address] [phone]            Final diagnoses:   Acute on chronic congestive heart failure, unspecified heart failure type        ED Disposition       ED Disposition   Discharge    Condition   Stable    Comment   --               This medical record created using voice recognition software.             Faheem Moya DO  09/27/24 0114

## 2024-10-03 LAB
QT INTERVAL: 331 MS
QTC INTERVAL: 464 MS

## 2024-11-04 ENCOUNTER — TELEPHONE (OUTPATIENT)
Dept: CARDIOLOGY | Facility: CLINIC | Age: 34
End: 2024-11-04
Payer: MEDICAID

## 2024-11-04 NOTE — TELEPHONE ENCOUNTER
Caller: Osmel Powell    Relationship to patient: Self    Best call back number: [phone]    Chief complaint: SHORTNESS OF BREATH     Type of visit: ED FOLLOW UP     Requested date: AS ADVISED          Additional notes:PATIENT STATES NEED TO BE SEEN ASAP DUE TO OUT OF LASIX MEDICATION.

## 2024-11-07 ENCOUNTER — OFFICE VISIT (OUTPATIENT)
Dept: CARDIOLOGY | Facility: CLINIC | Age: 34
End: 2024-11-07
Payer: MEDICAID

## 2024-11-07 VITALS
DIASTOLIC BLOOD PRESSURE: 89 MMHG | WEIGHT: 206.9 LBS | SYSTOLIC BLOOD PRESSURE: 123 MMHG | HEIGHT: 70 IN | BODY MASS INDEX: 29.62 KG/M2 | HEART RATE: 109 BPM

## 2024-11-07 DIAGNOSIS — I50.42 CHRONIC HEART FAILURE WITH REDUCED EJECTION FRACTION AND DIASTOLIC DYSFUNCTION: Primary | ICD-10-CM

## 2024-11-07 DIAGNOSIS — F17.210 CIGARETTE NICOTINE DEPENDENCE WITHOUT COMPLICATION: ICD-10-CM

## 2024-11-07 PROCEDURE — 1160F RVW MEDS BY RX/DR IN RCRD: CPT | Performed by: NURSE PRACTITIONER

## 2024-11-07 PROCEDURE — 99214 OFFICE O/P EST MOD 30 MIN: CPT | Performed by: NURSE PRACTITIONER

## 2024-11-07 PROCEDURE — 1159F MED LIST DOCD IN RCRD: CPT | Performed by: NURSE PRACTITIONER

## 2024-11-07 RX ORDER — DAPAGLIFLOZIN 10 MG/1
10 TABLET, FILM COATED ORAL DAILY
Qty: 90 TABLET | Refills: 3 | Status: SHIPPED | OUTPATIENT
Start: 2024-11-07

## 2024-11-07 RX ORDER — FUROSEMIDE 40 MG/1
40 TABLET ORAL DAILY
Qty: 90 TABLET | Refills: 3 | Status: SHIPPED | OUTPATIENT
Start: 2024-11-07

## 2024-11-07 NOTE — PROGRESS NOTES
Chief Complaint  Follow-up, Congestive Heart Failure, and Med Refill    Subjective            Osmel Powell presents to [facility] CARDIOLOG[facility]  History of Present Illness    Mr. Powell is here for follow-up.  He has congestive heart failure, last fraction of 20%.  Subsequent stress test did not show evidence of ischemia.  He was following with the heart failure clinic but has not been seen since January.  He presented to the ER in September with acute shortness of breath secondary to heart failure exacerbation.  After diuretics his symptoms improved and he was discharged home.  He presents back today for follow-up.  He has been out of his Lasix for 3 days short of breath and more swollen with that but otherwise was feeling good after resuming his guideline medical therapy.  He denies chest pain.  He also has a longstanding history of methamphetamine use.    PMH  Past Medical History:   Diagnosis Date    CHF (congestive heart failure) 12/2023    Er visit         SURGICALHX  Past Surgical History:   Procedure Laterality Date    ADENOIDECTOMY      TONSILLECTOMY          SOC  Social History     Socioeconomic History    Marital status: Single   Tobacco Use    Smoking status: Every Day     Current packs/day: 0.50     Average packs/day: 0.5 packs/day for 15.8 years (7.9 ttl pk-yrs)     Types: Cigarettes     Start date: 2009    Smokeless tobacco: Never   Vaping Use    Vaping status: Never Used   Substance and Sexual Activity    Alcohol use: Never    Drug use: Not Currently     Types: Marijuana     Comment: hx IV meth    Sexual activity: Yes     Partners: Female     Birth control/protection: Condom         FAMHX  History reviewed. No pertinent family history.       ALLERGY  No Known Allergies     MEDSCURRENT    Current Outpatient Medications:     carvedilol (Coreg) 12.5 MG tablet, Take 1 tablet by mouth Daily., Disp: 30 tablet, Rfl: 0    furosemide (LASIX) 40 MG tablet, Take 1 tablet by mouth Daily., Disp:  "90 tablet, Rfl: 3    sacubitril-valsartan (Entresto) 49-51 MG tablet, Take 1 tablet by mouth 2 (Two) Times a Day., Disp: 180 tablet, Rfl: 3    spironolactone (ALDACTONE) 25 MG tablet, Take 1 tablet by mouth Daily., Disp: 30 tablet, Rfl: 3    dapagliflozin Propanediol (Farxiga) 10 MG tablet, Take 10 mg by mouth Daily., Disp: 90 tablet, Rfl: 3      Review of Systems   Cardiovascular:  Positive for dyspnea on exertion and leg swelling. Negative for chest pain, palpitations and syncope.        Objective     /89   Pulse 109   Ht 177.8 cm (70\")   Wt 93.8 kg (206 lb 14.4 oz)   BMI 29.69 kg/m²       General Appearance:   well developed  well nourished  HENT:   oropharynx moist  lips not cyanotic  Neck:  thyroid not enlarged  supple  Respiratory:  no respiratory distress  normal breath sounds  no rales  Cardiovascular:  no jugular venous distention  regular rhythm  apical impulse normal  S1 normal, S2 normal  no S3, no S4   no murmur  no rub, no thrill  carotid pulses normal; no bruit  pedal pulses normal  lower extremity edema: Trace  Musculoskeletal:  no clubbing of fingers.   normocephalic, head atraumatic  Skin:   warm, dry  Psychiatric:  judgement and insight appropriate  normal mood and affect      Result Review :     The following data was reviewed by: ZAY Espinoza on 11/07/2024:    CMP          12/12/2023    05:44 1/17/2024    15:31 9/21/2024    08:09   CMP   Glucose 102  94  150    BUN 16  14  8    Creatinine 1.15  1.11  0.97    EGFR 85.1  88.8  103.8    Sodium 136  139  142    Potassium 4.1  3.9  3.7    Chloride 100  102  108    Calcium 9.1  9.4  8.6    Total Protein  8.0  6.4    Albumin  4.4  3.7    Globulin  3.6  2.7    Total Bilirubin  0.6  1.2    Alkaline Phosphatase  140  111    AST (SGOT)  24  19    ALT (SGPT)  33  21    Albumin/Globulin Ratio  1.2  1.4    BUN/Creatinine Ratio 13.9  12.6  8.2    Anion Gap 8.3  12.0  9.0      CBC          12/12/2023    05:44 1/17/2024    15:31 " 9/21/2024    08:09   CBC   WBC 10.55  7.38  6.22    RBC 5.04  5.21  3.77    Hemoglobin 15.5  15.7  12.3    Hematocrit 47.3  46.6  35.9    MCV 93.8  89.4  95.2    MCH 30.8  30.1  32.6    MCHC 32.8  33.7  34.3    RDW 12.7  12.6  13.6    Platelets 271  241  205        TSH          12/9/2023    21:16 1/17/2024    15:31   TSH   TSH 1.180  2.630        Data reviewed : Cardiology studies ER records reviewed.  Echo and stress test reviewed as above.      Procedures      Osmel Powell  reports that he has been smoking cigarettes. He started smoking about 15 years ago. He has a 7.9 pack-year smoking history. He has never used smokeless tobacco. I have educated him on the risk of diseases from using tobacco products such as cancer, COPD, and heart disease.     I advised him to quit and he is not willing to quit.    I spent 3  minutes counseling the patient.                Assessment and Plan        ASSESSMENT:  Encounter Diagnoses   Name Primary?    Chronic heart failure with reduced ejection fraction and diastolic dysfunction Yes    IV drug user     Cigarette nicotine dependence without complication          PLAN:    1.  Heart failure with reduced ejection fraction, ejection fraction 20% on last echocardiogram.  Stress test without evidence of ischemia he was following with the heart failure clinic but has not been seen in nearly a year.  He is out of his Lasix.  Will send refill today.  Will also start Farxiga.  Continue Entresto, carvedilol, and spironolactone.  Will order an echo in 2 months to reevaluate LV function and will schedule follow-up after that.  He is agreeable to this plan.  2.  Smoking cessation recommended.  3.  Follow-up arranged.          Patient was given instructions and counseling regarding his condition or for health maintenance advice. Please see specific information pulled into the AVS if appropriate.           Shagufta Mills, APRN   11/7/2024  13:26 EST

## 2024-11-19 ENCOUNTER — HOSPITAL ENCOUNTER (OUTPATIENT)
Dept: CARDIOLOGY | Facility: HOSPITAL | Age: 34
Discharge: HOME | End: 2024-11-19
Admitting: NURSE PRACTITIONER
Payer: MEDICAID

## 2024-11-19 DIAGNOSIS — I50.42 CHRONIC HEART FAILURE WITH REDUCED EJECTION FRACTION AND DIASTOLIC DYSFUNCTION: ICD-10-CM

## 2024-11-19 PROCEDURE — 93306 TTE W/DOPPLER COMPLETE: CPT

## 2024-11-21 LAB
AORTIC DIMENSIONLESS INDEX: 0.35 (DI)
ASCENDING AORTA: 3 CM
BH CV ECHO MEAS - ACS: 1.5 CM
BH CV ECHO MEAS - AO MEAN PG: 8 MMHG
BH CV ECHO MEAS - AO ROOT DIAM: 3.6 CM
BH CV ECHO MEAS - AO V2 VTI: 32.6 CM
BH CV ECHO MEAS - AVA(I,D): 1.57 CM2
BH CV ECHO MEAS - EDV(CUBED): 260.9 ML
BH CV ECHO MEAS - EDV(MOD-SP2): 222 ML
BH CV ECHO MEAS - EDV(MOD-SP4): 216 ML
BH CV ECHO MEAS - EF(MOD-BP): 22.8 %
BH CV ECHO MEAS - EF(MOD-SP2): 18 %
BH CV ECHO MEAS - EF(MOD-SP4): 24.1 %
BH CV ECHO MEAS - ESV(CUBED): 195.1 ML
BH CV ECHO MEAS - ESV(MOD-SP2): 182 ML
BH CV ECHO MEAS - ESV(MOD-SP4): 164 ML
BH CV ECHO MEAS - FS: 9.2 %
BH CV ECHO MEAS - IVS/LVPW: 1.03 CM
BH CV ECHO MEAS - IVSD: 1.03 CM
BH CV ECHO MEAS - LA DIMENSION: 4.5 CM
BH CV ECHO MEAS - LAT PEAK E' VEL: 10.7 CM/SEC
BH CV ECHO MEAS - LV DIASTOLIC VOL/BSA (35-75): 102.2 CM2
BH CV ECHO MEAS - LV MASS(C)D: 280.2 GRAMS
BH CV ECHO MEAS - LV MAX PG: 1.69 MMHG
BH CV ECHO MEAS - LV MEAN PG: 1 MMHG
BH CV ECHO MEAS - LV SYSTOLIC VOL/BSA (12-30): 77.6 CM2
BH CV ECHO MEAS - LV V1 MAX: 65 CM/SEC
BH CV ECHO MEAS - LV V1 VTI: 11.3 CM
BH CV ECHO MEAS - LVIDD: 6.4 CM
BH CV ECHO MEAS - LVIDS: 5.8 CM
BH CV ECHO MEAS - LVOT AREA: 4.5 CM2
BH CV ECHO MEAS - LVOT DIAM: 2.4 CM
BH CV ECHO MEAS - LVPWD: 1 CM
BH CV ECHO MEAS - MV DEC TIME: 0.15 SEC
BH CV ECHO MEAS - MV E MAX VEL: 91.5 CM/SEC
BH CV ECHO MEAS - PA V2 MAX: 110 CM/SEC
BH CV ECHO MEAS - RVDD: 3.9 CM
BH CV ECHO MEAS - SV(LVOT): 51.1 ML
BH CV ECHO MEAS - SV(MOD-SP2): 40 ML
BH CV ECHO MEAS - SV(MOD-SP4): 52 ML
BH CV ECHO MEAS - SVI(LVOT): 24.2 ML/M2
BH CV ECHO MEAS - SVI(MOD-SP2): 18.9 ML/M2
BH CV ECHO MEAS - SVI(MOD-SP4): 24.6 ML/M2
BH CV ECHO MEAS - TAPSE (>1.6): 2.29 CM
BH CV ECHO MEAS - TR MAX PG: 53.3 MMHG
BH CV ECHO MEAS - TR MAX VEL: 365 CM/SEC
BH CV XLRA - TDI S': 14.5 CM/SEC
IVRT: 63 MS
LEFT ATRIUM VOLUME INDEX: 33.6 ML/M2

## 2024-11-22 ENCOUNTER — TELEPHONE (OUTPATIENT)
Dept: CARDIOLOGY | Facility: CLINIC | Age: 34
End: 2024-11-22
Payer: MEDICAID

## 2024-11-22 DIAGNOSIS — I50.42 CHRONIC HEART FAILURE WITH REDUCED EJECTION FRACTION AND DIASTOLIC DYSFUNCTION: Primary | ICD-10-CM

## 2024-11-22 NOTE — TELEPHONE ENCOUNTER
----- Message from Shagufta Mills sent at 11/22/2024  9:06 AM EST -----  Echo reviewed.  Heart function is severely reduced, same range as previous study.    I actually wanted this echo done in a few months but it was ordered right after our visit.  Since he has resumed medications, I would like to give his medical therapy more time to work and repeat the echo in early February.  I have ordered this.  Keep follow-up with Dr. Parmar after echo.

## 2024-11-22 NOTE — TELEPHONE ENCOUNTER
Attempted to call patient. No answer. VM left with return call requested.   [employer] message sent.

## 2024-12-02 RX ORDER — SPIRONOLACTONE 25 MG/1
25 TABLET ORAL DAILY
Qty: 90 TABLET | Refills: 3 | Status: SHIPPED | OUTPATIENT
Start: 2024-12-02

## 2025-01-27 ENCOUNTER — OFFICE VISIT (OUTPATIENT)
Dept: CARDIOLOGY | Facility: CLINIC | Age: 35
End: 2025-01-27
Payer: MEDICAID

## 2025-01-27 VITALS
WEIGHT: 218 LBS | HEART RATE: 81 BPM | BODY MASS INDEX: 31.21 KG/M2 | SYSTOLIC BLOOD PRESSURE: 150 MMHG | DIASTOLIC BLOOD PRESSURE: 91 MMHG | HEIGHT: 70 IN

## 2025-01-27 DIAGNOSIS — I10 HYPERTENSION, ESSENTIAL: ICD-10-CM

## 2025-01-27 DIAGNOSIS — I50.42 CHRONIC HEART FAILURE WITH REDUCED EJECTION FRACTION AND DIASTOLIC DYSFUNCTION: Primary | ICD-10-CM

## 2025-01-27 DIAGNOSIS — Z87.898 H/O INTRAVENOUS DRUG USE IN REMISSION: ICD-10-CM

## 2025-01-27 PROCEDURE — 99214 OFFICE O/P EST MOD 30 MIN: CPT | Performed by: INTERNAL MEDICINE

## 2025-01-27 PROCEDURE — 1160F RVW MEDS BY RX/DR IN RCRD: CPT | Performed by: INTERNAL MEDICINE

## 2025-01-27 PROCEDURE — 1159F MED LIST DOCD IN RCRD: CPT | Performed by: INTERNAL MEDICINE

## 2025-01-27 NOTE — PROGRESS NOTES
Chief Complaint  3 month follow up and Chronic heart failure with reduced ejection fraction and di    Subjective            Osmel Powell presents to [facility] CARDIOLOGY  History of Present Illness    Mr. Powell is here for follow-up evaluation and management of dilated cardiomyopathy, previous illicit drug use, hypertension.  Since last visit he is doing overall relatively well.  He does have fatigue and shortness of breath.  He reports compliance with his medical therapy.  He reports being clean from drug use for the last 5 to 6 months.  No edema or fluid weight gain.    PMH  Past Medical History:   Diagnosis Date    CHF (congestive heart failure) 12/2023    Er visit         SURGICALHX  Past Surgical History:   Procedure Laterality Date    ADENOIDECTOMY      TONSILLECTOMY          SOC  Social History     Socioeconomic History    Marital status: Single   Tobacco Use    Smoking status: Every Day     Current packs/day: 0.50     Average packs/day: 0.5 packs/day for 16.1 years (8.0 ttl pk-yrs)     Types: Cigarettes     Start date: 2009    Smokeless tobacco: Never   Vaping Use    Vaping status: Never Used   Substance and Sexual Activity    Alcohol use: Never    Drug use: Not Currently     Types: Marijuana     Comment: hx IV meth    Sexual activity: Yes     Partners: Female     Birth control/protection: Condom         FAMHX  History reviewed. No pertinent family history.       ALLERGY  No Known Allergies     MEDSCURRENT    Current Outpatient Medications:     carvedilol (Coreg) 12.5 MG tablet, Take 1 tablet by mouth Daily., Disp: 30 tablet, Rfl: 0    dapagliflozin Propanediol (Farxiga) 10 MG tablet, Take 10 mg by mouth Daily., Disp: 90 tablet, Rfl: 3    furosemide (LASIX) 40 MG tablet, Take 1 tablet by mouth Daily., Disp: 90 tablet, Rfl: 3    spironolactone (ALDACTONE) 25 MG tablet, Take 1 tablet by mouth Daily., Disp: 90 tablet, Rfl: 3    sacubitril-valsartan (ENTRESTO)  MG tablet, Take 1  "tablet by mouth 2 (Two) Times a Day., Disp: 180 tablet, Rfl: 3      Review of Systems   Cardiovascular:  Positive for dyspnea on exertion and leg swelling. Negative for chest pain, palpitations and syncope.        Objective     /91   Pulse 81   Ht 177.8 cm (70\")   Wt 98.9 kg (218 lb)   BMI 31.28 kg/m²       General Appearance:   well developed  well nourished  HENT:   oropharynx moist  lips not cyanotic  Neck:  thyroid not enlarged  supple  Respiratory:  no respiratory distress  normal breath sounds  no rales  Cardiovascular:  no jugular venous distention  regular rhythm  apical impulse normal  S1 normal, S2 normal  no S3, no S4   no murmur  no rub, no thrill  carotid pulses normal; no bruit  pedal pulses normal  lower extremity edema: Trace  Musculoskeletal:  no clubbing of fingers.   normocephalic, head atraumatic  Skin:   warm, dry  Psychiatric:  judgement and insight appropriate  normal mood and affect      Result Review :     The following data was reviewed by: Matthew Parmar MD on 11/07/2024:    CMP          9/21/2024    08:09   CMP   Glucose 150    BUN 8    Creatinine 0.97    EGFR 103.8    Sodium 142    Potassium 3.7    Chloride 108    Calcium 8.6    Total Protein 6.4    Albumin 3.7    Globulin 2.7    Total Bilirubin 1.2    Alkaline Phosphatase 111    AST (SGOT) 19    ALT (SGPT) 21    Albumin/Globulin Ratio 1.4    BUN/Creatinine Ratio 8.2    Anion Gap 9.0      CBC          9/21/2024    08:09   CBC   WBC 6.22    RBC 3.77    Hemoglobin 12.3    Hematocrit 35.9    MCV 95.2    MCH 32.6    MCHC 34.3    RDW 13.6    Platelets 205              Data reviewed : Heart failure clinic records reviewed      Procedures      Osmel Powell  reports that he has been smoking cigarettes. He started smoking about 16 years ago. He has a 8 pack-year smoking history. He has never used smokeless tobacco. I have educated him on the risk of diseases from using tobacco products such as cancer, COPD, and heart " disease.     I advised him to quit and he is not willing to quit.    I spent 3  minutes counseling the patient.                Assessment and Plan        ASSESSMENT:  Encounter Diagnoses   Name Primary?    Chronic heart failure with reduced ejection fraction and diastolic dysfunction Yes    H/O intravenous drug use in remission     Hypertension, essential          PLAN:    1.  Chronic heart failure with reduced ejection fraction due to dilated cardiomyopathy.  Nonischemic with previous negative stress imaging.  Noted long history of drug use currently in remission.  Continue guideline directed medical therapy.  Increase Entresto dose due to hypertension.  Repeat CMP in 1 week.  Repeat echo is scheduled for February and he will have follow-up in March  2.  History of drug use in remission-he has been clean for the past 5 to 6 months, continue  3.  Essential hypertension-elevated today.  The higher dose Entresto should help with the blood pressure control    The patient is agreeable with this plan, follow-up in March      Patient was given instructions and counseling regarding his condition or for health maintenance advice. Please see specific information pulled into the AVS if appropriate.           Matthew Parmar MD   1/27/2025  14:16 EST

## 2025-03-27 RX ORDER — SPIRONOLACTONE 25 MG/1
25 TABLET ORAL DAILY
Qty: 90 TABLET | Refills: 3 | Status: SHIPPED | OUTPATIENT
Start: 2025-03-27

## 2025-03-27 RX ORDER — FUROSEMIDE 40 MG/1
40 TABLET ORAL DAILY
Qty: 90 TABLET | Refills: 3 | Status: SHIPPED | OUTPATIENT
Start: 2025-03-27

## 2025-03-27 RX ORDER — DAPAGLIFLOZIN 10 MG/1
10 TABLET, FILM COATED ORAL DAILY
Qty: 90 TABLET | Refills: 3 | Status: SHIPPED | OUTPATIENT
Start: 2025-03-27